# Patient Record
Sex: FEMALE | Race: WHITE | NOT HISPANIC OR LATINO | Employment: FULL TIME | URBAN - METROPOLITAN AREA
[De-identification: names, ages, dates, MRNs, and addresses within clinical notes are randomized per-mention and may not be internally consistent; named-entity substitution may affect disease eponyms.]

---

## 2017-11-27 ENCOUNTER — CONVERSION ENCOUNTER (OUTPATIENT)
Dept: MAMMOGRAPHY | Facility: HOSPITAL | Age: 52
End: 2017-11-27

## 2018-04-23 ENCOUNTER — CONVERSION ENCOUNTER (OUTPATIENT)
Dept: ORTHOPEDIC SURGERY | Facility: CLINIC | Age: 53
End: 2018-04-23

## 2018-04-23 ENCOUNTER — OFFICE VISIT CONVERTED (OUTPATIENT)
Dept: ORTHOPEDIC SURGERY | Facility: CLINIC | Age: 53
End: 2018-04-23
Attending: ORTHOPAEDIC SURGERY

## 2018-06-21 ENCOUNTER — OFFICE VISIT CONVERTED (OUTPATIENT)
Dept: ORTHOPEDIC SURGERY | Facility: CLINIC | Age: 53
End: 2018-06-21
Attending: ORTHOPAEDIC SURGERY

## 2018-09-20 ENCOUNTER — OFFICE VISIT CONVERTED (OUTPATIENT)
Dept: ORTHOPEDIC SURGERY | Facility: CLINIC | Age: 53
End: 2018-09-20
Attending: ORTHOPAEDIC SURGERY

## 2018-12-29 ENCOUNTER — CONVERSION ENCOUNTER (OUTPATIENT)
Dept: MAMMOGRAPHY | Facility: HOSPITAL | Age: 53
End: 2018-12-29

## 2019-03-07 ENCOUNTER — OFFICE VISIT CONVERTED (OUTPATIENT)
Dept: ORTHOPEDIC SURGERY | Facility: CLINIC | Age: 54
End: 2019-03-07
Attending: ORTHOPAEDIC SURGERY

## 2019-09-10 ENCOUNTER — HOSPITAL ENCOUNTER (OUTPATIENT)
Dept: OTHER | Facility: HOSPITAL | Age: 54
Discharge: HOME | End: 2019-09-10

## 2019-09-10 LAB
ALBUMIN SERPL-MCNC: 4.1 G/DL (ref 3.5–5)
ALBUMIN/GLOB SERPL: 1.3 {RATIO} (ref 1.4–2.6)
ALP SERPL-CCNC: 99 U/L (ref 53–141)
ALT SERPL-CCNC: 20 U/L (ref 10–40)
ANION GAP SERPL CALC-SCNC: 14 MMOL/L (ref 8–19)
AST SERPL-CCNC: 26 U/L (ref 15–50)
BASOPHILS # BLD AUTO: 0.11 10*3/UL (ref 0–0.2)
BASOPHILS NFR BLD AUTO: 2.6 % (ref 0–3)
BILIRUB SERPL-MCNC: 0.4 MG/DL (ref 0.2–1.3)
BUN SERPL-MCNC: 12 MG/DL (ref 5–25)
BUN/CREAT SERPL: 14 {RATIO} (ref 6–20)
CALCIUM SERPL-MCNC: 8.6 MG/DL (ref 8.7–10.4)
CHLORIDE SERPL-SCNC: 107 MMOL/L (ref 99–111)
CHOLEST SERPL-MCNC: 122 MG/DL (ref 107–200)
CHOLEST/HDLC SERPL: 5.3 {RATIO} (ref 3–6)
CONV ABS IMM GRAN: 0.01 10*3/UL (ref 0–0.2)
CONV CO2: 24 MMOL/L (ref 22–32)
CONV IMMATURE GRAN: 0.2 % (ref 0–1.8)
CONV TOTAL PROTEIN: 7.3 G/DL (ref 6.3–8.2)
CREAT UR-MCNC: 0.84 MG/DL (ref 0.5–0.9)
DEPRECATED RDW RBC AUTO: 51.4 FL (ref 36.4–46.3)
EOSINOPHIL # BLD AUTO: 0.7 10*3/UL (ref 0–0.7)
EOSINOPHIL # BLD AUTO: 16.3 % (ref 0–7)
ERYTHROCYTE [DISTWIDTH] IN BLOOD BY AUTOMATED COUNT: 14.6 % (ref 11.7–14.4)
GFR SERPLBLD BASED ON 1.73 SQ M-ARVRAT: >60 ML/MIN/{1.73_M2}
GLOBULIN UR ELPH-MCNC: 3.2 G/DL (ref 2–3.5)
GLUCOSE SERPL-MCNC: 89 MG/DL (ref 65–99)
HCT VFR BLD AUTO: 37.9 % (ref 37–47)
HDLC SERPL-MCNC: 23 MG/DL (ref 40–60)
HGB BLD-MCNC: 11.6 G/DL (ref 12–16)
LDLC SERPL CALC-MCNC: 83 MG/DL (ref 70–100)
LYMPHOCYTES # BLD AUTO: 1.44 10*3/UL (ref 1–5)
LYMPHOCYTES NFR BLD AUTO: 33.6 % (ref 20–45)
MCH RBC QN AUTO: 29.3 PG (ref 27–31)
MCHC RBC AUTO-ENTMCNC: 30.6 G/DL (ref 33–37)
MCV RBC AUTO: 95.7 FL (ref 81–99)
MONOCYTES # BLD AUTO: 0.55 10*3/UL (ref 0.2–1.2)
MONOCYTES NFR BLD AUTO: 12.8 % (ref 3–10)
NEUTROPHILS # BLD AUTO: 1.48 10*3/UL (ref 2–8)
NEUTROPHILS NFR BLD AUTO: 34.5 % (ref 30–85)
NRBC CBCN: 0 % (ref 0–0.7)
OSMOLALITY SERPL CALC.SUM OF ELEC: 291 MOSM/KG (ref 273–304)
PLATELET # BLD AUTO: 411 10*3/UL (ref 130–400)
PMV BLD AUTO: 10.6 FL (ref 9.4–12.3)
POTASSIUM SERPL-SCNC: 4 MMOL/L (ref 3.5–5.3)
RBC # BLD AUTO: 3.96 10*6/UL (ref 4.2–5.4)
SODIUM SERPL-SCNC: 141 MMOL/L (ref 135–147)
TRIGL SERPL-MCNC: 78 MG/DL (ref 40–150)
TSH SERPL-ACNC: 1.78 M[IU]/L (ref 0.27–4.2)
VLDLC SERPL-MCNC: 16 MG/DL (ref 5–37)
WBC # BLD AUTO: 4.29 10*3/UL (ref 4.8–10.8)

## 2019-09-16 ENCOUNTER — HOSPITAL ENCOUNTER (OUTPATIENT)
Dept: FAMILY MEDICINE CLINIC | Facility: CLINIC | Age: 54
Discharge: HOME | End: 2019-09-16
Attending: FAMILY MEDICINE

## 2019-09-19 LAB
CONV LAST MENSTURAL PERIOD: NORMAL
SPECIMEN SOURCE: NORMAL
SPECIMEN SOURCE: NORMAL
THIN PREP CVX: NORMAL

## 2019-10-03 ENCOUNTER — OFFICE VISIT CONVERTED (OUTPATIENT)
Dept: ORTHOPEDIC SURGERY | Facility: CLINIC | Age: 54
End: 2019-10-03
Attending: PHYSICIAN ASSISTANT

## 2019-12-23 ENCOUNTER — HOSPITAL ENCOUNTER (OUTPATIENT)
Dept: MRI IMAGING | Facility: HOSPITAL | Age: 54
Discharge: HOME | End: 2019-12-23
Attending: ORTHOPAEDIC SURGERY

## 2019-12-26 ENCOUNTER — OFFICE VISIT CONVERTED (OUTPATIENT)
Dept: ORTHOPEDIC SURGERY | Facility: CLINIC | Age: 54
End: 2019-12-26
Attending: ORTHOPAEDIC SURGERY

## 2019-12-31 ENCOUNTER — HOSPITAL ENCOUNTER (OUTPATIENT)
Dept: MAMMOGRAPHY | Facility: HOSPITAL | Age: 54
Discharge: HOME | End: 2019-12-31
Attending: FAMILY MEDICINE

## 2020-02-03 ENCOUNTER — HOSPITAL ENCOUNTER (OUTPATIENT)
Dept: PHYSICAL THERAPY | Facility: CLINIC | Age: 55
Setting detail: RECURRING SERIES
Discharge: HOME | End: 2020-03-09
Attending: ORTHOPAEDIC SURGERY

## 2020-09-15 ENCOUNTER — HOSPITAL ENCOUNTER (OUTPATIENT)
Dept: OTHER | Facility: HOSPITAL | Age: 55
Discharge: HOME | End: 2020-09-15

## 2020-09-15 LAB
ALBUMIN SERPL-MCNC: 4 G/DL (ref 3.5–5)
ALBUMIN/GLOB SERPL: 1.3 {RATIO} (ref 1.4–2.6)
ALP SERPL-CCNC: 99 U/L (ref 53–141)
ALT SERPL-CCNC: 22 U/L (ref 10–40)
ANION GAP SERPL CALC-SCNC: 12 MMOL/L (ref 8–19)
AST SERPL-CCNC: 24 U/L (ref 15–50)
BASOPHILS # BLD AUTO: 0.11 10*3/UL (ref 0–0.2)
BASOPHILS NFR BLD AUTO: 2.2 % (ref 0–3)
BILIRUB SERPL-MCNC: 0.32 MG/DL (ref 0.2–1.3)
BUN SERPL-MCNC: 13 MG/DL (ref 5–25)
BUN/CREAT SERPL: 14 {RATIO} (ref 6–20)
CALCIUM SERPL-MCNC: 8.9 MG/DL (ref 8.7–10.4)
CHLORIDE SERPL-SCNC: 107 MMOL/L (ref 99–111)
CHOLEST SERPL-MCNC: 148 MG/DL (ref 107–200)
CHOLEST/HDLC SERPL: 6.7 {RATIO} (ref 3–6)
CONV ABS IMM GRAN: 0.01 10*3/UL (ref 0–0.2)
CONV CO2: 26 MMOL/L (ref 22–32)
CONV IMMATURE GRAN: 0.2 % (ref 0–1.8)
CONV TOTAL PROTEIN: 7.1 G/DL (ref 6.3–8.2)
CREAT UR-MCNC: 0.9 MG/DL (ref 0.5–0.9)
DEPRECATED RDW RBC AUTO: 49.6 FL (ref 36.4–46.3)
EOSINOPHIL # BLD AUTO: 0.71 10*3/UL (ref 0–0.7)
EOSINOPHIL # BLD AUTO: 14.5 % (ref 0–7)
ERYTHROCYTE [DISTWIDTH] IN BLOOD BY AUTOMATED COUNT: 13.9 % (ref 11.7–14.4)
GFR SERPLBLD BASED ON 1.73 SQ M-ARVRAT: >60 ML/MIN/{1.73_M2}
GLOBULIN UR ELPH-MCNC: 3.1 G/DL (ref 2–3.5)
GLUCOSE SERPL-MCNC: 92 MG/DL (ref 65–99)
HCT VFR BLD AUTO: 38.9 % (ref 37–47)
HDLC SERPL-MCNC: 22 MG/DL (ref 40–60)
HGB BLD-MCNC: 12.3 G/DL (ref 12–16)
LDLC SERPL CALC-MCNC: 104 MG/DL (ref 70–100)
LYMPHOCYTES # BLD AUTO: 1.55 10*3/UL (ref 1–5)
LYMPHOCYTES NFR BLD AUTO: 31.7 % (ref 20–45)
MCH RBC QN AUTO: 30.4 PG (ref 27–31)
MCHC RBC AUTO-ENTMCNC: 31.6 G/DL (ref 33–37)
MCV RBC AUTO: 96.3 FL (ref 81–99)
MONOCYTES # BLD AUTO: 0.71 10*3/UL (ref 0.2–1.2)
MONOCYTES NFR BLD AUTO: 14.5 % (ref 3–10)
NEUTROPHILS # BLD AUTO: 1.8 10*3/UL (ref 2–8)
NEUTROPHILS NFR BLD AUTO: 36.9 % (ref 30–85)
NRBC CBCN: 0 % (ref 0–0.7)
OSMOLALITY SERPL CALC.SUM OF ELEC: 292 MOSM/KG (ref 273–304)
PLATELET # BLD AUTO: 416 10*3/UL (ref 130–400)
PMV BLD AUTO: 9.6 FL (ref 9.4–12.3)
POTASSIUM SERPL-SCNC: 4.2 MMOL/L (ref 3.5–5.3)
RBC # BLD AUTO: 4.04 10*6/UL (ref 4.2–5.4)
SODIUM SERPL-SCNC: 141 MMOL/L (ref 135–147)
TRIGL SERPL-MCNC: 111 MG/DL (ref 40–150)
TSH SERPL-ACNC: 2.03 M[IU]/L (ref 0.27–4.2)
VLDLC SERPL-MCNC: 22 MG/DL (ref 5–37)
WBC # BLD AUTO: 4.89 10*3/UL (ref 4.8–10.8)

## 2020-09-23 ENCOUNTER — HOSPITAL ENCOUNTER (OUTPATIENT)
Dept: FAMILY MEDICINE CLINIC | Facility: CLINIC | Age: 55
Discharge: HOME | End: 2020-09-23
Attending: FAMILY MEDICINE

## 2020-10-15 ENCOUNTER — CONVERSION ENCOUNTER (OUTPATIENT)
Dept: ORTHOPEDIC SURGERY | Facility: CLINIC | Age: 55
End: 2020-10-15

## 2020-10-15 ENCOUNTER — OFFICE VISIT CONVERTED (OUTPATIENT)
Dept: ORTHOPEDIC SURGERY | Facility: CLINIC | Age: 55
End: 2020-10-15
Attending: ORTHOPAEDIC SURGERY

## 2021-01-07 ENCOUNTER — HOSPITAL ENCOUNTER (OUTPATIENT)
Dept: MAMMOGRAPHY | Facility: HOSPITAL | Age: 56
Discharge: HOME | End: 2021-01-07
Attending: FAMILY MEDICINE

## 2021-01-22 ENCOUNTER — HOSPITAL ENCOUNTER (OUTPATIENT)
Dept: OTHER | Facility: HOSPITAL | Age: 56
Discharge: HOME | End: 2021-01-22
Attending: INTERNAL MEDICINE

## 2021-02-18 ENCOUNTER — HOSPITAL ENCOUNTER (OUTPATIENT)
Dept: VACCINE CLINIC | Facility: HOSPITAL | Age: 56
Discharge: HOME | End: 2021-02-18
Attending: INTERNAL MEDICINE

## 2021-05-13 NOTE — PROGRESS NOTES
Progress Note      Patient Name: Romelia Young   Patient ID: [id number]   Sex: Female   YOB: 1965    Primary Care Provider: Indra Dunlap MD   Referring Provider: Ephraim Lamb MD    Visit Date: October 15, 2020    Provider: Ephraim Lamb MD   Location: [facility]   Location Address: [address] [place] [address]  [id number]   Location Phone: [phone]          Chief Complaint  · Left hip pain   · Right shoulder naif n      History Of Present Illness  Romelia Young is a 55 year old /White female who presents today to [facility]. Patient presents today for follow-up. She has been getting injections in her right shoulder and left hip. She is requesting repeat injections today. She does have a known rotator cuff tear and is interested in surgery at some point, but is trying to put this off as long as possible.       Past Medical History  Allergic rhinitis, chronic; Anemia, Unspecified; Arthritis; Arthritis; Bursitis, right hip; Bursitis: Right Shoulder; GERD; Left ankle pain; Pain of right hip joint; Reflux; Right shoulder pain, unspecified chronicity; Seasonal allergies; Shoulder Impingement, right; Shoulder tendinitis; Sprain: Ankle, left; Tendonitis: Shoulder, Right; Trochanteric Bursitis, Right         Past Surgical History  *Metal Implant; C-sections; Cesarian Section; Clavicle Fracture Surgery - ORIF; Colonoscopy; endoscopy; Tuboplasty         Medication List  Celebrex 100 mg oral capsule; Celexa 20 mg oral tablet; Lipitor 10 mg oral tablet; Tylenol Extra Strength 500 mg oral tablet; Zantac 150 mg oral tablet; Zyrtec 10 mg oral tablet         Allergy List  NO KNOWN DRUG ALLERGIES         Family Medical History  Heart Disease; Hypertension; Diabetes, unspecified type; Renal Calculus; Family history of certain chronic disabling diseases; arthritis; Osteoporosis; Family history of Arthritis         Social History  Alcohol (Never); Alcohol Use  "(Never); Caffeine (Current some day); lives with children; lives with spouse; .; Recreational Drug Use (Never); Second hand smoke exposure (Never); Tobacco (Former); Working         Review of Systems  · Constitutional  o Denies  o : fever, chills, weight loss  · Cardiovascular  o Denies  o : chest pain, shortness of breath  · Gastrointestinal  o Denies  o : liver disease, heartburn, nausea, blood in stools  · Genitourinary  o Denies  o : painful urination, blood in urine  · Integument  o Denies  o : rash, itching  · Neurologic  o Denies  o : headache, weakness, loss of consciousness  · Musculoskeletal  o Admits  o : painful, swollen joints  · Psychiatric  o Denies  o : drug/alcohol addiction, anxiety, depression      Vitals  Date Time BP Position Site L\R Cuff Size HR RR TEMP (F) WT  HT  BMI kg/m2 BSA m2 O2 Sat FR L/min FiO2        10/15/2020 03:07 PM      77 - R   163lbs 4oz 5'  5\" 27.17 1.84 98 %            Physical Examination  · Constitutional  o Appearance  o : well developed, well-nourished, no obvious deformities present  · Head and Face  o Head  o :   § Inspection  § : normocephalic  o Face  o :   § Inspection  § : no facial lesions  · Eyes  o Conjunctivae  o : conjunctivae normal  o Sclerae  o : sclerae white  · Ears, Nose, Mouth and Throat  o Ears  o :   § External Ears  § : appearance within normal limits  § Hearing  § : intact  o Nose  o :   § External Nose  § : appearance normal  · Neck  o Inspection/Palpation  o : normal appearance  o Range of Motion  o : full range of motion  · Respiratory  o Respiratory Effort  o : breathing unlabored  o Inspection of Chest  o : normal appearance  o Auscultation of Lungs  o : no audible wheezing or rales  · Cardiovascular  o Heart  o : regular rate  · Gastrointestinal  o Abdominal Examination  o : soft and non-tender  · Skin and Subcutaneous Tissue  o General Inspection  o : intact, no rashes  · Psychiatric  o General  o : Alert and oriented x3  o Judgement " and Insight  o : judgment and insight intact  o Mood and Affect  o : mood normal, affect appropriate  · Right Shoulder  o Inspection  o : Tender to palpation over the lateral right shoulder. Range of motion is mildly limited. Strength 4+/5 rotator cuff. Neurovascularly intact to extremity. Positive impingement sign.  · Left Hip  o Inspection  o : Tender to palpation greater trochanter. Range of motion intact. 5/5 strength. Neurovascularly intact.   · Injection Note/Aspiration Note  o Site  o : right shoulder, left hip  o Procedure  o : Procedure: After educating the patient, patient gave consent for procedure. After using Chloraprep, the joint space was injected. The patient tolerated the procedure well.  o Medication  o : 7ml's of 1% Lidocaine, 3cc's of 4 mg Dexamethasone          Assessment  · Left hip trochanteric bursitis     727.3/M71.9  · Left Pain: Hip     719.45/M25.559  · Right shoulder pain, unspecified chronicity     719.41/M25.511  · Left shoulder rotator cuff tear     840.4/M75.100      Plan  · Orders  o 2.00 - Dexamethasone Injection 8mg (-5) - 719.45/M25.559 - 10/15/2020   Lot [id number] Exp 02 2021 [facility] Administered by CHIDI ESPARZA MD   o Hip Intra-articular Injection without US Guidance [facility] (27086) - 719.45/M25.559 - 10/15/2020   Lot [id number] Exp 02 2021 [facility] Administered by CHIDI ESPARZA MD   o 2.00 - Dexamethasone Injection 8mg (-6) - - 10/15/2020   Lot [id number] Exp 02 2021 [facility] Administered by CHIDI ESPARZA MD   o Shoulder Intra-articular Injection without US Guidance [facility] (60711) - - 10/15/2020   Lot [id number] Exp 08 01 2021 [facility] Administered by CHIDI ESPARZA MD   · Medications  o Medications have been Reconciled  o Transition of Care or Provider Policy  · Instructions  o Reviewed the patient's Past Medical, Social, and Family history as well as the ROS at today's visit, no changes.  o Call or return if worsening symptoms.  o This note is transcribed  by Valeria aguilar The above service was scribed by Marcia Christensen on my behalf and I attest to the accuracy of the note. ruth aguilar Discussed treatment option with the patient. She wishes to proceed with right shoulder injection and left hip injection and she tolerated this well. Plan for follow-up as needed.             Electronically Signed by: Valeria Duron, -Author on October 28, 2020 02:24:18 PM  Electronically Co-signed by: Ephraim Lamb MD -Reviewer on October 28, 2020 10:07:43 PM

## 2021-05-14 VITALS — OXYGEN SATURATION: 98 % | BODY MASS INDEX: 27.2 KG/M2 | WEIGHT: 163.25 LBS | HEART RATE: 77 BPM | HEIGHT: 65 IN

## 2021-05-15 VITALS — HEART RATE: 66 BPM | WEIGHT: 153.25 LBS | HEIGHT: 65 IN | BODY MASS INDEX: 25.53 KG/M2 | OXYGEN SATURATION: 97 %

## 2021-05-15 VITALS — HEART RATE: 77 BPM | HEIGHT: 65 IN | OXYGEN SATURATION: 98 %

## 2021-05-16 VITALS — BODY MASS INDEX: 25.34 KG/M2 | WEIGHT: 152.12 LBS | HEIGHT: 65 IN | OXYGEN SATURATION: 98 % | HEART RATE: 86 BPM

## 2021-05-16 VITALS — BODY MASS INDEX: 25.24 KG/M2 | WEIGHT: 151.5 LBS | OXYGEN SATURATION: 98 % | HEIGHT: 65 IN | HEART RATE: 84 BPM

## 2021-05-16 VITALS — OXYGEN SATURATION: 98 % | HEART RATE: 67 BPM | WEIGHT: 152.12 LBS | BODY MASS INDEX: 25.34 KG/M2 | HEIGHT: 65 IN

## 2021-05-16 VITALS — WEIGHT: 149 LBS | OXYGEN SATURATION: 95 % | HEIGHT: 65 IN | BODY MASS INDEX: 24.83 KG/M2

## 2021-07-27 ENCOUNTER — OFFICE VISIT (OUTPATIENT)
Dept: ORTHOPEDIC SURGERY | Facility: CLINIC | Age: 56
End: 2021-07-27

## 2021-07-27 VITALS — BODY MASS INDEX: 26.99 KG/M2 | WEIGHT: 162 LBS | RESPIRATION RATE: 14 BRPM | HEIGHT: 65 IN

## 2021-07-27 DIAGNOSIS — M25.551 BILATERAL HIP PAIN: Primary | ICD-10-CM

## 2021-07-27 DIAGNOSIS — M25.552 BILATERAL HIP PAIN: Primary | ICD-10-CM

## 2021-07-27 DIAGNOSIS — M70.60 TROCHANTERIC BURSITIS, UNSPECIFIED LATERALITY: ICD-10-CM

## 2021-07-27 PROCEDURE — 20610 DRAIN/INJ JOINT/BURSA W/O US: CPT | Performed by: ORTHOPAEDIC SURGERY

## 2021-07-27 PROCEDURE — 99214 OFFICE O/P EST MOD 30 MIN: CPT | Performed by: ORTHOPAEDIC SURGERY

## 2021-07-27 RX ORDER — ATORVASTATIN CALCIUM 10 MG/1
TABLET, FILM COATED ORAL
COMMUNITY
Start: 2021-05-17 | End: 2021-09-13 | Stop reason: ALTCHOICE

## 2021-07-27 RX ORDER — CETIRIZINE HYDROCHLORIDE 10 MG/1
TABLET ORAL
COMMUNITY

## 2021-07-27 RX ORDER — ACETAMINOPHEN 500 MG
TABLET ORAL
COMMUNITY
End: 2023-04-04

## 2021-07-27 RX ORDER — CELECOXIB 100 MG/1
CAPSULE ORAL
COMMUNITY
End: 2021-09-13 | Stop reason: SDUPTHER

## 2021-07-27 RX ORDER — CITALOPRAM 20 MG/1
TABLET ORAL
COMMUNITY
End: 2021-09-13 | Stop reason: ALTCHOICE

## 2021-07-27 RX ADMIN — METHYLPREDNISOLONE ACETATE 80 MG: 80 INJECTION, SUSPENSION INTRA-ARTICULAR; INTRALESIONAL; INTRAMUSCULAR; SOFT TISSUE at 13:43

## 2021-07-27 RX ADMIN — LIDOCAINE HYDROCHLORIDE 9 ML: 10 INJECTION, SOLUTION INFILTRATION; PERINEURAL at 13:43

## 2021-07-27 RX ADMIN — METHYLPREDNISOLONE ACETATE 80 MG: 80 INJECTION, SUSPENSION INTRA-ARTICULAR; INTRALESIONAL; INTRAMUSCULAR; SOFT TISSUE at 13:41

## 2021-07-27 RX ADMIN — LIDOCAINE HYDROCHLORIDE 9 ML: 10 INJECTION, SOLUTION INFILTRATION; PERINEURAL at 13:41

## 2021-08-01 RX ORDER — LIDOCAINE HYDROCHLORIDE 10 MG/ML
9 INJECTION, SOLUTION INFILTRATION; PERINEURAL
Status: COMPLETED | OUTPATIENT
Start: 2021-07-27 | End: 2021-07-27

## 2021-08-01 RX ORDER — METHYLPREDNISOLONE ACETATE 80 MG/ML
80 INJECTION, SUSPENSION INTRA-ARTICULAR; INTRALESIONAL; INTRAMUSCULAR; SOFT TISSUE
Status: COMPLETED | OUTPATIENT
Start: 2021-07-27 | End: 2021-07-27

## 2021-09-08 ENCOUNTER — LAB (OUTPATIENT)
Dept: LAB | Facility: HOSPITAL | Age: 56
End: 2021-09-08

## 2021-09-08 ENCOUNTER — TRANSCRIBE ORDERS (OUTPATIENT)
Dept: LAB | Facility: HOSPITAL | Age: 56
End: 2021-09-08

## 2021-09-08 DIAGNOSIS — Z13.9 SCREENING DUE: ICD-10-CM

## 2021-09-08 DIAGNOSIS — Z13.9 SCREENING DUE: Primary | ICD-10-CM

## 2021-09-08 LAB
ALBUMIN SERPL-MCNC: 4.3 G/DL (ref 3.5–5.2)
ALBUMIN/GLOB SERPL: 1.4 G/DL
ALP SERPL-CCNC: 105 U/L (ref 39–117)
ALT SERPL W P-5'-P-CCNC: 31 U/L (ref 1–33)
ANION GAP SERPL CALCULATED.3IONS-SCNC: 7.7 MMOL/L (ref 5–15)
AST SERPL-CCNC: 26 U/L (ref 1–32)
BASOPHILS # BLD AUTO: 0.12 10*3/MM3 (ref 0–0.2)
BASOPHILS NFR BLD AUTO: 2.3 % (ref 0–1.5)
BILIRUB SERPL-MCNC: 0.4 MG/DL (ref 0–1.2)
BUN SERPL-MCNC: 15 MG/DL (ref 6–20)
BUN/CREAT SERPL: 17.2 (ref 7–25)
CALCIUM SPEC-SCNC: 9.4 MG/DL (ref 8.6–10.5)
CHLORIDE SERPL-SCNC: 107 MMOL/L (ref 98–107)
CHOLEST SERPL-MCNC: 142 MG/DL (ref 0–200)
CO2 SERPL-SCNC: 25.3 MMOL/L (ref 22–29)
CREAT SERPL-MCNC: 0.87 MG/DL (ref 0.57–1)
DEPRECATED RDW RBC AUTO: 46.7 FL (ref 37–54)
EOSINOPHIL # BLD AUTO: 0.48 10*3/MM3 (ref 0–0.4)
EOSINOPHIL NFR BLD AUTO: 9.1 % (ref 0.3–6.2)
ERYTHROCYTE [DISTWIDTH] IN BLOOD BY AUTOMATED COUNT: 13.4 % (ref 12.3–15.4)
GFR SERPL CREATININE-BSD FRML MDRD: 68 ML/MIN/1.73
GLOBULIN UR ELPH-MCNC: 3.1 GM/DL
GLUCOSE SERPL-MCNC: 86 MG/DL (ref 65–99)
HCT VFR BLD AUTO: 39.2 % (ref 34–46.6)
HDLC SERPL-MCNC: 22 MG/DL (ref 40–60)
HGB BLD-MCNC: 12.6 G/DL (ref 12–15.9)
IMM GRANULOCYTES # BLD AUTO: 0.02 10*3/MM3 (ref 0–0.05)
IMM GRANULOCYTES NFR BLD AUTO: 0.4 % (ref 0–0.5)
LDLC SERPL CALC-MCNC: 102 MG/DL (ref 0–100)
LDLC/HDLC SERPL: 4.61 {RATIO}
LYMPHOCYTES # BLD AUTO: 1.47 10*3/MM3 (ref 0.7–3.1)
LYMPHOCYTES NFR BLD AUTO: 28 % (ref 19.6–45.3)
MCH RBC QN AUTO: 30.4 PG (ref 26.6–33)
MCHC RBC AUTO-ENTMCNC: 32.1 G/DL (ref 31.5–35.7)
MCV RBC AUTO: 94.5 FL (ref 79–97)
MONOCYTES # BLD AUTO: 0.75 10*3/MM3 (ref 0.1–0.9)
MONOCYTES NFR BLD AUTO: 14.3 % (ref 5–12)
NEUTROPHILS NFR BLD AUTO: 2.41 10*3/MM3 (ref 1.7–7)
NEUTROPHILS NFR BLD AUTO: 45.9 % (ref 42.7–76)
NRBC BLD AUTO-RTO: 0 /100 WBC (ref 0–0.2)
PLATELET # BLD AUTO: 474 10*3/MM3 (ref 140–450)
PMV BLD AUTO: 10.4 FL (ref 6–12)
POTASSIUM SERPL-SCNC: 3.9 MMOL/L (ref 3.5–5.2)
PROT SERPL-MCNC: 7.4 G/DL (ref 6–8.5)
RBC # BLD AUTO: 4.15 10*6/MM3 (ref 3.77–5.28)
SODIUM SERPL-SCNC: 140 MMOL/L (ref 136–145)
TRIGL SERPL-MCNC: 93 MG/DL (ref 0–150)
TSH SERPL DL<=0.05 MIU/L-ACNC: 1.18 UIU/ML (ref 0.27–4.2)
VLDLC SERPL-MCNC: 18 MG/DL (ref 5–40)
WBC # BLD AUTO: 5.25 10*3/MM3 (ref 3.4–10.8)

## 2021-09-08 PROCEDURE — 80061 LIPID PANEL: CPT

## 2021-09-08 PROCEDURE — 84443 ASSAY THYROID STIM HORMONE: CPT

## 2021-09-08 PROCEDURE — 36415 COLL VENOUS BLD VENIPUNCTURE: CPT

## 2021-09-08 PROCEDURE — 85025 COMPLETE CBC W/AUTO DIFF WBC: CPT

## 2021-09-08 PROCEDURE — 80053 COMPREHEN METABOLIC PANEL: CPT

## 2021-09-13 ENCOUNTER — TELEPHONE (OUTPATIENT)
Dept: FAMILY MEDICINE CLINIC | Facility: CLINIC | Age: 56
End: 2021-09-13

## 2021-09-13 ENCOUNTER — OFFICE VISIT (OUTPATIENT)
Dept: FAMILY MEDICINE CLINIC | Facility: CLINIC | Age: 56
End: 2021-09-13

## 2021-09-13 VITALS
HEART RATE: 69 BPM | DIASTOLIC BLOOD PRESSURE: 84 MMHG | HEIGHT: 65 IN | BODY MASS INDEX: 27.63 KG/M2 | RESPIRATION RATE: 18 BRPM | WEIGHT: 165.8 LBS | OXYGEN SATURATION: 99 % | TEMPERATURE: 98.8 F | SYSTOLIC BLOOD PRESSURE: 138 MMHG

## 2021-09-13 DIAGNOSIS — F41.9 ANXIETY: ICD-10-CM

## 2021-09-13 DIAGNOSIS — M25.50 ARTHRALGIA, UNSPECIFIED JOINT: ICD-10-CM

## 2021-09-13 DIAGNOSIS — K21.9 GASTROESOPHAGEAL REFLUX DISEASE WITHOUT ESOPHAGITIS: ICD-10-CM

## 2021-09-13 DIAGNOSIS — E78.6 LOW HDL (UNDER 40): ICD-10-CM

## 2021-09-13 DIAGNOSIS — J30.9 CHRONIC ALLERGIC RHINITIS: ICD-10-CM

## 2021-09-13 DIAGNOSIS — Z12.31 ENCOUNTER FOR SCREENING MAMMOGRAM FOR MALIGNANT NEOPLASM OF BREAST: Primary | ICD-10-CM

## 2021-09-13 PROBLEM — D64.9 ANEMIA: Status: ACTIVE | Noted: 2021-09-13

## 2021-09-13 PROBLEM — M25.551 PAIN OF RIGHT HIP JOINT: Status: ACTIVE | Noted: 2018-04-25

## 2021-09-13 PROBLEM — M70.61 TROCHANTERIC BURSITIS OF RIGHT HIP: Status: ACTIVE | Noted: 2018-06-26

## 2021-09-13 PROBLEM — M25.572 LEFT ANKLE PAIN: Status: ACTIVE | Noted: 2018-06-26

## 2021-09-13 PROBLEM — M25.811 SHOULDER IMPINGEMENT, RIGHT: Status: ACTIVE | Noted: 2018-09-25

## 2021-09-13 PROBLEM — S93.409A SPRAIN OF ANKLE: Status: ACTIVE | Noted: 2018-06-21

## 2021-09-13 PROBLEM — M25.511 RIGHT SHOULDER PAIN: Status: ACTIVE | Noted: 2018-04-25

## 2021-09-13 PROBLEM — M72.0 DUPUYTREN CONTRACTURE: Status: ACTIVE | Noted: 2020-01-16

## 2021-09-13 PROBLEM — M71.9 BURSITIS: Status: ACTIVE | Noted: 2018-04-25

## 2021-09-13 PROBLEM — J30.2 SEASONAL ALLERGIC RHINITIS: Status: ACTIVE | Noted: 2021-09-13

## 2021-09-13 PROBLEM — M75.41 SHOULDER IMPINGEMENT, RIGHT: Status: ACTIVE | Noted: 2018-09-25

## 2021-09-13 PROBLEM — M75.50 BURSITIS OF SHOULDER: Status: ACTIVE | Noted: 2018-09-25

## 2021-09-13 PROCEDURE — 99203 OFFICE O/P NEW LOW 30 MIN: CPT | Performed by: NURSE PRACTITIONER

## 2021-09-13 RX ORDER — CITALOPRAM 40 MG/1
40 TABLET ORAL DAILY
Qty: 90 TABLET | Refills: 3 | Status: SHIPPED | OUTPATIENT
Start: 2021-09-13 | End: 2022-07-27 | Stop reason: SDUPTHER

## 2021-09-13 RX ORDER — FAMOTIDINE 20 MG/1
20 TABLET, FILM COATED ORAL
COMMUNITY

## 2021-09-13 RX ORDER — ATORVASTATIN CALCIUM 10 MG/1
TABLET, FILM COATED ORAL
COMMUNITY
End: 2021-09-13 | Stop reason: SDUPTHER

## 2021-09-13 RX ORDER — CELECOXIB 100 MG/1
100 CAPSULE ORAL DAILY
Qty: 90 CAPSULE | Refills: 3 | Status: SHIPPED | OUTPATIENT
Start: 2021-09-13 | End: 2022-08-18 | Stop reason: SDUPTHER

## 2021-09-13 RX ORDER — ATORVASTATIN CALCIUM 10 MG/1
10 TABLET, FILM COATED ORAL NIGHTLY
Qty: 90 TABLET | Refills: 3 | Status: SHIPPED | OUTPATIENT
Start: 2021-09-13 | End: 2022-08-18 | Stop reason: SDUPTHER

## 2021-09-13 NOTE — PROGRESS NOTES
Chief Complaint  Establish Care (New Patient. Previous PCP was Dr. Dunlap), Annual Exam (Needing Annual Physical form completed for insurance.  Employee lab work done), and Anxiety (Taking Celexa. Pt reported her BP has been elevated more lately)    Subjective          Romelia Young is a 55 y.o. female who presents to [facility]    History of Present Illness     Chronic elevated platelets.   Eosinophils elevated chronically. Pt reports she was stung by yellow jackets recently prior to labs.     Allergies controlled with multiple otc allergy meds.     Lipids normal, HDL chronically low. Pt takes Lipitor to increase HDL.     Arthralgia - pt see ortho for steroid injections.    B/p wnl.    Anxiety - well controlled with meds.     GERD - improved with pepcid and diet modifications.     PHQ-2 Total Score: 0   PHQ-9 Total Score: 0       Review of Systems   Constitutional: Negative for chills, fatigue and fever.   Respiratory: Negative for cough and shortness of breath.    Cardiovascular: Negative for chest pain and palpitations.   Gastrointestinal: Negative for constipation, diarrhea, nausea and vomiting.   Musculoskeletal: Positive for arthralgias (multiple joints). Negative for back pain and neck pain.   Skin: Negative for rash.   Allergic/Immunologic: Positive for environmental allergies.   Neurological: Negative for dizziness and headaches.          Medical History: has a past medical history of Allergic, Anemia, unspecified, Ankle sprain (06/21/2018), Anxiety, Arthritis, Bursitis of hip, right (04/15/2018), Bursitis of right shoulder (09/25/2018), Chronic allergic rhinitis, Concussion (08/2012), GERD (gastroesophageal reflux disease), Hemorrhoids, Hernia, hiatal, Left ankle pain (06/26/2018), Menopause, Pain of right hip joint (04/25/2018), Right shoulder pain (04/25/2018), Seasonal allergies, Sensitive skin, Shoulder impingement, right (09/25/2018), Shoulder tendinitis  (2015), Shoulder tendinitis, right (2018), and Trochanteric bursitis, right hip (2018).     Surgical History: has a past surgical history that includes Other surgical history; Clavicle surgery (); Esophagogastroduodenoscopy; Other surgical history;  section (); and Colonoscopy (2017).     Family History: family history includes Arthritis in her father and mother; Diabetes in her father and sister; Heart disease in her father, mother, and sister; Hypertension in her father and mother; Osteoporosis in her mother; Urolithiasis in her brother.     Social History: reports that she quit smoking about 26 years ago. She started smoking about 38 years ago. She has a 3.00 pack-year smoking history. She has never used smokeless tobacco. She reports that she does not drink alcohol and does not use drugs.    Allergies: Patient has no known allergies.      Health Maintenance Due   Topic Date Due   • ANNUAL PHYSICAL  Never done   • TDAP/TD VACCINES (1 - Tdap) Never done   • ZOSTER VACCINE (1 of 2) Never done   • HEPATITIS C SCREENING  Never done            Current Outpatient Medications:   •  acetaminophen (TYLENOL) 500 MG tablet, Tylenol Extra Strength 500 mg oral tablet take 1 tablet (500 mg) by oral route every 3 hours as needed   Active, Disp: , Rfl:   •  atorvastatin (Lipitor) 10 MG tablet, Take 1 tablet by mouth Every Night., Disp: 90 tablet, Rfl: 3  •  celecoxib (CeleBREX) 100 MG capsule, Take 1 capsule by mouth Daily., Disp: 90 capsule, Rfl: 3  •  cetirizine (ZyrTEC Allergy) 10 MG tablet, Zyrtec oral tablet 10 mg take 1 tablet (10 mg) by oral route once daily   Active, Disp: , Rfl:   •  citalopram (CeleXA) 40 MG tablet, Take 1 tablet by mouth Daily., Disp: 90 tablet, Rfl: 3  •  famotidine (PEPCID) 20 MG tablet, Take 20 mg by mouth., Disp: , Rfl:       Immunization History   Administered Date(s) Administered   • COVID-19 (MODERNA) 2021, 2021         Objective       Vitals:  "   09/13/21 0834   BP: 138/84   Pulse: 69   Resp: 18   Temp: 98.8 °F (37.1 °C)   TempSrc: Temporal   SpO2: 99%   Weight: 75.2 kg (165 lb 12.8 oz)   Height: 165.1 cm (65\")   PainSc: 0-No pain      Body mass index is 27.59 kg/m².   Wt Readings from Last 3 Encounters:   09/13/21 75.2 kg (165 lb 12.8 oz)   07/27/21 73.5 kg (162 lb)   10/15/20 74 kg (163 lb 4 oz)      BP Readings from Last 3 Encounters:   09/13/21 138/84        Physical Exam  Vitals reviewed.   Constitutional:       Appearance: Normal appearance. She is well-developed.   HENT:      Head: Normocephalic and atraumatic.   Eyes:      Conjunctiva/sclera: Conjunctivae normal.      Pupils: Pupils are equal, round, and reactive to light.   Cardiovascular:      Rate and Rhythm: Normal rate and regular rhythm.      Heart sounds: Normal heart sounds. No murmur heard.     Pulmonary:      Effort: Pulmonary effort is normal.      Breath sounds: Normal breath sounds. No wheezing or rhonchi.   Abdominal:      General: Bowel sounds are normal. There is no distension.      Palpations: Abdomen is soft.      Tenderness: There is no abdominal tenderness.   Skin:     General: Skin is warm and dry.   Neurological:      Mental Status: She is alert and oriented to person, place, and time.   Psychiatric:         Mood and Affect: Mood and affect normal.         Behavior: Behavior normal.         Thought Content: Thought content normal.         Judgment: Judgment normal.             Result Review :     CMP    CMP 9/8/21   Glucose 86   BUN 15   Creatinine 0.87   eGFR Non African Am 68   Sodium 140   Potassium 3.9   Chloride 107   Calcium 9.4   Albumin 4.30   Total Bilirubin 0.4   Alkaline Phosphatase 105   AST (SGOT) 26   ALT (SGPT) 31           Lipid Panel    Lipid Panel 9/8/21   Total Cholesterol 142   Triglycerides 93   HDL Cholesterol 22 (A)   VLDL Cholesterol 18   LDL Cholesterol  102 (A)   LDL/HDL Ratio 4.61   (A) Abnormal value                           Assessment and Plan  "       Diagnoses and all orders for this visit:    1. Encounter for screening mammogram for malignant neoplasm of breast (Primary)  -     Mammo Screening Bilateral With CAD; Future    2. Gastroesophageal reflux disease without esophagitis    3. Low HDL (under 40)  -     atorvastatin (Lipitor) 10 MG tablet; Take 1 tablet by mouth Every Night.  Dispense: 90 tablet; Refill: 3    4. Anxiety  -     citalopram (CeleXA) 40 MG tablet; Take 1 tablet by mouth Daily.  Dispense: 90 tablet; Refill: 3    5. Chronic allergic rhinitis  Assessment & Plan:  Continue otc allergy meds.       6. Arthralgia, unspecified joint  -     celecoxib (CeleBREX) 100 MG capsule; Take 1 capsule by mouth Daily.  Dispense: 90 capsule; Refill: 3        Follow Up     Return in about 1 year (around 9/13/2022) for Annual physical.     Pap due in 2 years.     Patient was given instructions and counseling regarding her condition or for health maintenance advice. Please see specific information pulled into the AVS if appropriate.     KAITLYN Hooks

## 2021-09-13 NOTE — PATIENT INSTRUCTIONS
"[email or web address]   Generalized Anxiety Disorder, Adult  Generalized anxiety disorder (BRYANT) is a mental health condition. Unlike normal worries, anxiety related to BRYANT is not triggered by a specific event. These worries do not fade or get better with time. BRYANT interferes with relationships, work, and school.  BRYANT symptoms can vary from mild to severe. People with severe BRYANT can have intense waves of anxiety with physical symptoms that are similar to panic attacks.  What are the causes?  The exact cause of BRYANT is not known, but the following are believed to have an impact:  · Differences in natural brain chemicals.  · Genes passed down from parents to children.  · Differences in the way threats are perceived.  · Development during childhood.  · Personality.  What increases the risk?  The following factors may make you more likely to develop this condition:  · Being female.  · Having a family history of anxiety disorders.  · Being very shy.  · Experiencing very stressful life events, such as the death of a loved one.  · Having a very stressful family environment.  What are the signs or symptoms?  People with BRYANT often worry excessively about many things in their lives, such as their health and family. Symptoms may also include:  · Mental and emotional symptoms:  ? Worrying excessively about natural disasters.  ? Fear of being late.  ? Difficulty concentrating.  ? Fears that others are judging your performance.  · Physical symptoms:  ? Fatigue.  ? Headaches, muscle tension, muscle twitches, trembling, or feeling shaky.  ? Feeling like your heart is pounding or beating very fast.  ? Feeling out of breath or like you cannot take a deep breath.  ? Having trouble falling asleep or staying asleep, or experiencing restlessness.  ? Sweating.  ? Nausea, diarrhea, or irritable bowel syndrome (IBS).  · Behavioral symptoms:  ? Experiencing erratic moods or irritability.  ? Avoidance of new situations.  ? Avoidance of " people.  ? Extreme difficulty making decisions.  How is this diagnosed?  This condition is diagnosed based on your symptoms and medical history. You will also have a physical exam. Your health care provider may perform tests to rule out other possible causes of your symptoms.  To be diagnosed with BRYANT, a person must have anxiety that:  · Is out of his or her control.  · Affects several different aspects of his or her life, such as work and relationships.  · Causes distress that makes him or her unable to take part in normal activities.  · Includes at least three symptoms of BRYANT, such as restlessness, fatigue, trouble concentrating, irritability, muscle tension, or sleep problems.  Before your health care provider can confirm a diagnosis of BRYANT, these symptoms must be present more days than they are not, and they must last for 6 months or longer.  How is this treated?  This condition may be treated with:  · Medicine. Antidepressant medicine is usually prescribed for long-term daily control. Anti-anxiety medicines may be added in severe cases, especially when panic attacks occur.  · Talk therapy (psychotherapy). Certain types of talk therapy can be helpful in treating BRYANT by providing support, education, and guidance. Options include:  ? Cognitive behavioral therapy (CBT). People learn coping skills and self-calming techniques to ease their physical symptoms. They learn to identify unrealistic thoughts and behaviors and to replace them with more appropriate thoughts and behaviors.  ? Acceptance and commitment therapy (ACT). This treatment teaches people how to be mindful as a way to cope with unwanted thoughts and feelings.  ? Biofeedback. This process trains you to manage your body's response (physiological response) through breathing techniques and relaxation methods. You will work with a therapist while machines are used to monitor your physical symptoms.  · Stress management techniques. These include yoga,  meditation, and exercise.  A mental health specialist can help determine which treatment is best for you. Some people see improvement with one type of therapy. However, other people require a combination of therapies.  Follow these instructions at home:  Lifestyle  · Maintain a consistent routine and schedule.  · Anticipate stressful situations. Create a plan, and allow extra time to work with your plan.  · Practice stress management or self-calming techniques that you have learned from your therapist or your health care provider.  General instructions  · Take over-the-counter and prescription medicines only as told by your health care provider.  · Understand that you are likely to have setbacks. Accept this and be kind to yourself as you persist to take better care of yourself.  · Recognize and accept your accomplishments, even if you  them as small.  · Keep all follow-up visits as told by your health care provider. This is important.  Contact a health care provider if:  · Your symptoms do not get better.  · Your symptoms get worse.  · You have signs of depression, such as:  ? A persistently sad or irritable mood.  ? Loss of enjoyment in activities that used to bring you norma.  ? Change in weight or eating.  ? Changes in sleeping habits.  ? Avoiding friends or family members.  ? Loss of energy for normal tasks.  ? Feelings of guilt or worthlessness.  Get help right away if:  · You have serious thoughts about hurting yourself or others.  If you ever feel like you may hurt yourself or others, or have thoughts about taking your own life, get help right away. Go to your nearest emergency department or:  · Call your local emergency services (241 in the [place]  · Call a suicide crisis helpline, such as the National Suicide Prevention Lifeline at 1-274.767.2662. This is open 24 hours a day in the [place]  · Text the Crisis Text Line at 281692 (in the [place]  Summary  · Generalized anxiety disorder (BRYANT) is a mental  health condition that involves worry that is not triggered by a specific event.  · People with BRYANT often worry excessively about many things in their lives, such as their health and family.  · BRYANT may cause symptoms such as restlessness, trouble concentrating, sleep problems, frequent sweating, nausea, diarrhea, headaches, and trembling or muscle twitching.  · A mental health specialist can help determine which treatment is best for you. Some people see improvement with one type of therapy. However, other people require a combination of therapies.  This information is not intended to replace advice given to you by your health care provider. Make sure you discuss any questions you have with your health care provider.  Document Revised: 10/07/2020 Document Reviewed: 10/07/2020  [employer] Patient Education © 2021 [employer]      Preventive Care 40-64 Years Old, Female  Preventive care refers to visits with your health care provider and lifestyle choices that can promote health and wellness. This includes:  · A yearly physical exam. This may also be called an annual well check.  · Regular dental visits and eye exams.  · Immunizations.  · Screening for certain conditions.  · Healthy lifestyle choices, such as eating a healthy diet, getting regular exercise, not using drugs or products that contain nicotine and tobacco, and limiting alcohol use.  What can I expect for my preventive care visit?  Physical exam  Your health care provider will check your:  · Height and weight. This may be used to calculate body mass index (BMI), which tells if you are at a healthy weight.  · Heart rate and blood pressure.  · Skin for abnormal spots.  Counseling  Your health care provider may ask you questions about your:  · Alcohol, tobacco, and drug use.  · Emotional well-being.  · Home and relationship well-being.  · Sexual activity.  · Eating habits.  · Work and work environment.  · Method of birth control.  · Menstrual  cycle.  · Pregnancy history.  What immunizations do I need?    Influenza (flu) vaccine  · This is recommended every year.  Tetanus, diphtheria, and pertussis (Tdap) vaccine  · You may need a Td booster every 10 years.  Varicella (chickenpox) vaccine  · You may need this if you have not been vaccinated.  Zoster (shingles) vaccine  · You may need this after age 60.  Measles, mumps, and rubella (MMR) vaccine  · You may need at least one dose of MMR if you were born in 1957 or later. You may also need a second dose.  Pneumococcal conjugate (PCV13) vaccine  · You may need this if you have certain conditions and were not previously vaccinated.  Pneumococcal polysaccharide (PPSV23) vaccine  · You may need one or two doses if you smoke cigarettes or if you have certain conditions.  Meningococcal conjugate (MenACWY) vaccine  · You may need this if you have certain conditions.  Hepatitis A vaccine  · You may need this if you have certain conditions or if you travel or work in places where you may be exposed to hepatitis A.  Hepatitis B vaccine  · You may need this if you have certain conditions or if you travel or work in places where you may be exposed to hepatitis B.  Haemophilus influenzae type b (Hib) vaccine  · You may need this if you have certain conditions.  Human papillomavirus (HPV) vaccine  · If recommended by your health care provider, you may need three doses over 6 months.  You may receive vaccines as individual doses or as more than one vaccine together in one shot (combination vaccines). Talk with your health care provider about the risks and benefits of combination vaccines.  What tests do I need?  Blood tests  · Lipid and cholesterol levels. These may be checked every 5 years, or more frequently if you are over 50 years old.  · Hepatitis C test.  · Hepatitis B test.  Screening  · Lung cancer screening. You may have this screening every year starting at age 55 if you have a 30-pack-year history of smoking and  currently smoke or have quit within the past 15 years.  · Colorectal cancer screening. All adults should have this screening starting at age 50 and continuing until age 75. Your health care provider may recommend screening at age 45 if you are at increased risk. You will have tests every 1-10 years, depending on your results and the type of screening test.  · Diabetes screening. This is done by checking your blood sugar (glucose) after you have not eaten for a while (fasting). You may have this done every 1-3 years.  · Mammogram. This may be done every 1-2 years. Talk with your health care provider about when you should start having regular mammograms. This may depend on whether you have a family history of breast cancer.  · BRCA-related cancer screening. This may be done if you have a family history of breast, ovarian, tubal, or peritoneal cancers.  · Pelvic exam and Pap test. This may be done every 3 years starting at age 21. Starting at age 30, this may be done every 5 years if you have a Pap test in combination with an HPV test.  Other tests  · Sexually transmitted disease (STD) testing.  · Bone density scan. This is done to screen for osteoporosis. You may have this scan if you are at high risk for osteoporosis.  Follow these instructions at home:  Eating and drinking  · Eat a diet that includes fresh fruits and vegetables, whole grains, lean protein, and low-fat dairy.  · Take vitamin and mineral supplements as recommended by your health care provider.  · Do not drink alcohol if:  ? Your health care provider tells you not to drink.  ? You are pregnant, may be pregnant, or are planning to become pregnant.  · If you drink alcohol:  ? Limit how much you have to 0-1 drink a day.  ? Be aware of how much alcohol is in your drink. In the [place] one drink equals one 12 oz bottle of beer (355 mL), one 5 oz glass of wine (148 mL), or one 1½ oz glass of hard liquor (44 mL).  Lifestyle  · Take daily care of your teeth and  gums.  · Stay active. Exercise for at least 30 minutes on 5 or more days each week.  · Do not use any products that contain nicotine or tobacco, such as cigarettes, e-cigarettes, and chewing tobacco. If you need help quitting, ask your health care provider.  · If you are sexually active, practice safe sex. Use a condom or other form of birth control (contraception) in order to prevent pregnancy and STIs (sexually transmitted infections).  · If told by your health care provider, take low-dose aspirin daily starting at age 50.  What's next?  · Visit your health care provider once a year for a well check visit.  · Ask your health care provider how often you should have your eyes and teeth checked.  · Stay up to date on all vaccines.  This information is not intended to replace advice given to you by your health care provider. Make sure you discuss any questions you have with your health care provider.  Document Revised: 08/29/2019 Document Reviewed: 08/29/2019  [employer] Patient Education © 2020 [employer]

## 2021-12-17 ENCOUNTER — IMMUNIZATION (OUTPATIENT)
Dept: VACCINE CLINIC | Facility: HOSPITAL | Age: 56
End: 2021-12-17

## 2021-12-17 DIAGNOSIS — Z23 NEED FOR VACCINATION: Primary | ICD-10-CM

## 2021-12-17 PROCEDURE — 0064A HC ADM SARSCOV2 50MCG/0.25ML BOOSTER: CPT | Performed by: INTERNAL MEDICINE

## 2021-12-17 PROCEDURE — 91306 HC SARSCOV2 VAC 50MCG/0.25ML IM: CPT | Performed by: INTERNAL MEDICINE

## 2022-01-11 ENCOUNTER — HOSPITAL ENCOUNTER (OUTPATIENT)
Dept: MAMMOGRAPHY | Facility: HOSPITAL | Age: 57
Discharge: HOME | End: 2022-01-11
Admitting: NURSE PRACTITIONER

## 2022-01-11 DIAGNOSIS — Z12.31 ENCOUNTER FOR SCREENING MAMMOGRAM FOR MALIGNANT NEOPLASM OF BREAST: ICD-10-CM

## 2022-01-11 PROCEDURE — 77067 SCR MAMMO BI INCL CAD: CPT

## 2022-01-11 PROCEDURE — 77063 BREAST TOMOSYNTHESIS BI: CPT

## 2022-03-01 ENCOUNTER — OFFICE VISIT (OUTPATIENT)
Dept: PODIATRY | Facility: CLINIC | Age: 57
End: 2022-03-01

## 2022-03-01 VITALS
BODY MASS INDEX: 27.49 KG/M2 | OXYGEN SATURATION: 99 % | HEIGHT: 65 IN | TEMPERATURE: 97.3 F | HEART RATE: 75 BPM | WEIGHT: 165 LBS | DIASTOLIC BLOOD PRESSURE: 76 MMHG | SYSTOLIC BLOOD PRESSURE: 133 MMHG

## 2022-03-01 DIAGNOSIS — M79.671 FOOT PAIN, RIGHT: Primary | ICD-10-CM

## 2022-03-01 DIAGNOSIS — L03.031 PARONYCHIA OF TOE OF RIGHT FOOT: ICD-10-CM

## 2022-03-01 DIAGNOSIS — B35.1 ONYCHOMYCOSIS: ICD-10-CM

## 2022-03-01 DIAGNOSIS — L60.0 ONYCHOCRYPTOSIS: ICD-10-CM

## 2022-03-01 PROCEDURE — 11750 EXCISION NAIL&NAIL MATRIX: CPT | Performed by: PODIATRIST

## 2022-03-01 PROCEDURE — 99203 OFFICE O/P NEW LOW 30 MIN: CPT | Performed by: PODIATRIST

## 2022-03-01 NOTE — PROGRESS NOTES
[facility] - PODIATRY    Today's Date: 22    Patient Name: Romelia Young  MRN: [id number]  [facility]: [id number]  PCP: Haven Thomas APRN, Last PCP Visit: 2022  Referring Provider: No ref. provider found    SUBJECTIVE     Chief Complaint   Patient presents with   • Right Foot - Establish Care, Nail Problem     Patient wants 1st, 2nd, 3rd toenails removed      HPI: Romelia Young, a 56 y.o.female, comes to clinic.    New, Established, New Problem:  New  Location:  Dystrophic thickened painful toenails on the right first, second, and third toes  Duration:   Greater than 1 year  Onset:  Gradual  Nature:  sore with palpation.  Stable, worsening, improving:   Worsening  Aggravating factors:  Pain with shoe gear and ambulation.  Previous Treatment:  Self debridement    No other pedal complaints at this time.    Patient denies any fevers, chills, nausea, vomiting, shortness of breath, nor any other constitutional signs nor symptoms.       Past Medical History:   Diagnosis Date   • Allergic    • Anemia, unspecified    • Ankle sprain 2018    left   • Anxiety    • Arthritis    • Bursitis of hip, right 04/15/2018   • Bursitis of right shoulder 2018   • Callus    • Chronic allergic rhinitis    • Concussion 2012    Head injury   • GERD (gastroesophageal reflux disease)    • Hemorrhoids    • Hernia, hiatal    • History of transfusion    • Infectious viral hepatitis C antibodies   • Ingrown toenail Past   • Left ankle pain 2018   • Menopause     37 yrs old   • Pain of right hip joint 2018   • Plantar fasciitis Past   • Right shoulder pain 2018   • Seasonal allergies    • Sensitive skin    • Shoulder impingement, right 2018   • Shoulder tendinitis 2015   • Shoulder tendinitis, right 2018   • Trochanteric bursitis, right hip 2018     Past Surgical History:   Procedure Laterality Date   •  SECTION     • CLAVICLE SURGERY       clavicle fracture surgery - ORIF   • COLONOSCOPY  2017   • ENDOSCOPY     • OTHER SURGICAL HISTORY      Metal Implant   • OTHER SURGICAL HISTORY      Tuboplasty   • TOENAIL EXCISION       Family History   Problem Relation Age of Onset   • Heart disease Mother    • Hypertension Mother    • Arthritis Mother    • Osteoporosis Mother    • Heart disease Father    • Hypertension Father    • Diabetes Father    • Arthritis Father    • Heart disease Sister    • Diabetes Sister    • Urolithiasis Brother      Social History     Socioeconomic History   • Marital status:    Tobacco Use   • Smoking status: Former Smoker     Packs/day: 0.25     Years: 12.00     Pack years: 3.00     Start date:      Quit date:      Years since quittin.1   • Smokeless tobacco: Never Used   Vaping Use   • Vaping Use: Never used   Substance and Sexual Activity   • Alcohol use: Never   • Drug use: Never   • Sexual activity: Defer     No Known Allergies  Current Outpatient Medications   Medication Sig Dispense Refill   • acetaminophen (TYLENOL) 500 MG tablet Tylenol Extra Strength 500 mg oral tablet take 1 tablet (500 mg) by oral route every 3 hours as needed   Active     • atorvastatin (Lipitor) 10 MG tablet Take 1 tablet by mouth Every Night. 90 tablet 3   • celecoxib (CeleBREX) 100 MG capsule Take 1 capsule by mouth Daily. 90 capsule 3   • cetirizine (ZyrTEC Allergy) 10 MG tablet Zyrtec oral tablet 10 mg take 1 tablet (10 mg) by oral route once daily   Active     • citalopram (CeleXA) 40 MG tablet Take 1 tablet by mouth Daily. 90 tablet 3   • famotidine (PEPCID) 20 MG tablet Take 20 mg by mouth.       No current facility-administered medications for this visit.     Review of Systems   Skin:        Painful right first, second, third toenails   All other systems reviewed and are negative.      OBJECTIVE     Vitals:    22 1312   BP: 133/76   Pulse: 75   Temp: 97.3 °F (36.3 °C)   SpO2: 99%       PHYSICAL EXAM  GEN:       Foot/Ankle Exam:       General:   Appearance: appears stated age and healthy    Orientation: AAOx3    Affect: appropriate    Gait: unimpaired      VASCULAR      Right Foot Vascularity   Normal vascular exam    Dorsalis pedis:  2+  Posterior tibial:  2+  Skin Temperature: warm    Edema Grading:  None  CFT:  < 3 seconds  Pedal Hair Growth:  Present  Varicosities: none       Left Foot Vascularity   Normal vascular exam    Dorsalis pedis:  2+  Posterior tibial:  2+  Skin Temperature: warm    Edema Grading:  None  CFT:  < 3 seconds  Pedal Hair Growth:  Present  Varicosities: none        NEUROLOGIC     Right Foot Neurologic   Normal sensation    Light touch sensation:  Normal  Vibratory sensation:  Normal  Hot/Cold sensation: normal       Left Foot Neurologic   Normal sensation    Light touch sensation:  Normal  Vibratory sensation:  Normal  Hot/cold sensation: normal       MUSCLE STRENGTH     Right Foot Muscle Strength   Normal strength    Foot dorsiflexion:  5  Foot plantar flexion:  5  Foot inversion:  5  Foot eversion:  5     Left Foot Muscle Strength   Normal strength    Foot dorsiflexion:  5  Foot plantar flexion:  5  Foot inversion:  5  Foot eversion:  5     RANGE OF MOTION      Right Foot Range of Motion   Foot and ankle ROM within normal limits       Left Foot Range of Motion   Foot and ankle ROM within normal limits       DERMATOLOGIC     Right Foot Dermatologic   Skin: skin intact    Nails: onychomycosis, ingrown toenail and paronychia    Nails comment:  Right 1st, second, third     Left Foot Dermatologic   Skin: skin intact    Nails: normal        ASSESSMENT/PLAN     Diagnoses and all orders for this visit:    1. Foot pain, right (Primary)    2. Onychomycosis    3. Onychocryptosis    4. Paronychia of toe of right foot      Comprehensive lower extremity examination and evaluation was performed.    Discussed findings and treatment plan including risks, benefits, and treatment options with patient in detail.  Patient agreed with treatment plan.    Phenol and Alcohol Chemical Matrixectomy Procedure - This procedure is indicated for onychomycosis and onychocryptosis of the right first, second, third total toenails. Indications, risks and benefits and alternative treatments have been discussed with this patient who has agreed to this procedure. The area was sterilely prepped with a povidone-iodine solution. The affected area was locally anesthetized with 3 ml, of lidocaine 1%. The offending nail plate was completely excised.  Next 3 applications of 89% phenol were applied to the matrix area x 30 seconds followed by irrigation with copious amounts of isopropyl alcohol.  A sterile dressing was applied. The patient tolerated the procedure well.     Patient was given post procedure care instructions.  The patient states understanding and agreement with this plan.    An After Visit Summary was printed and given to the patient at discharge, including (if requested) any available informative/educational handouts regarding diagnosis, treatment, or medications. All questions were answered to patient/family satisfaction. Should symptoms fail to improve or worsen they agree to call or return to clinic or to go to the Emergency Department. Discussed the importance of following up with any needed screening tests/labs/specialist appointments and any requested follow-up recommended by me today. Importance of maintaining follow-up discussed and patient accepts that missed appointments can delay diagnosis and potentially lead to worsening of conditions.    Return in about 2 weeks (around 3/15/2022) for Post-Procedure., or sooner if acute issues arise.    This document has been electronically signed by Bienvenido Travis DPM on March 1, 2022 13:49 EST

## 2022-03-15 ENCOUNTER — OFFICE VISIT (OUTPATIENT)
Dept: PODIATRY | Facility: CLINIC | Age: 57
End: 2022-03-15

## 2022-03-15 VITALS
SYSTOLIC BLOOD PRESSURE: 138 MMHG | OXYGEN SATURATION: 100 % | BODY MASS INDEX: 26.33 KG/M2 | WEIGHT: 158 LBS | TEMPERATURE: 97.5 F | DIASTOLIC BLOOD PRESSURE: 79 MMHG | HEIGHT: 65 IN | HEART RATE: 86 BPM

## 2022-03-15 DIAGNOSIS — L03.031 PARONYCHIA OF TOE OF RIGHT FOOT: ICD-10-CM

## 2022-03-15 DIAGNOSIS — B35.1 ONYCHOMYCOSIS: ICD-10-CM

## 2022-03-15 DIAGNOSIS — L60.0 ONYCHOCRYPTOSIS: ICD-10-CM

## 2022-03-15 DIAGNOSIS — M79.671 FOOT PAIN, RIGHT: Primary | ICD-10-CM

## 2022-03-15 PROCEDURE — 99212 OFFICE O/P EST SF 10 MIN: CPT | Performed by: PODIATRIST

## 2022-03-15 NOTE — PROGRESS NOTES
[facility] - PODIATRY    Today's Date: 03/15/22    Patient Name: Romelia Young  MRN: [id number]  [facility]: [id number]  PCP: Haven Thomas APRN, Last PCP Visit: 2022  Referring Provider: No ref. provider found    SUBJECTIVE     Chief Complaint   Patient presents with   • Right Foot - Follow-up     P&A     HPI: Romelia Young, a 56 y.o.female, comes to clinic.    New, Established, New Problem:  Established  Location:  Dystrophic thickened painful toenails on the right first, second, and third toes  Duration:   Greater than 1 year  Onset:  Gradual  Nature:  sore with palpation.  Stable, worsening, improving:   Improving  Aggravating factors:  Pain with shoe gear and ambulation.  Previous Treatment:  chemical matrixectomies    No other pedal complaints at this time.    Patient denies any fevers, chills, nausea, vomiting, shortness of breath, nor any other constitutional signs nor symptoms.       Past Medical History:   Diagnosis Date   • Allergic    • Anemia, unspecified    • Ankle sprain 2018    left   • Anxiety    • Arthritis    • Bursitis of hip, right 04/15/2018   • Bursitis of right shoulder 2018   • Callus    • Chronic allergic rhinitis    • Concussion 2012    Head injury   • GERD (gastroesophageal reflux disease)    • Hemorrhoids    • Hernia, hiatal    • History of transfusion    • Infectious viral hepatitis C antibodies   • Ingrown toenail Past   • Left ankle pain 2018   • Menopause     37 yrs old   • Pain of right hip joint 2018   • Plantar fasciitis Past   • Right shoulder pain 2018   • Seasonal allergies    • Sensitive skin    • Shoulder impingement, right 2018   • Shoulder tendinitis 2015   • Shoulder tendinitis, right 2018   • Trochanteric bursitis, right hip 2018     Past Surgical History:   Procedure Laterality Date   •  SECTION     • CLAVICLE SURGERY      clavicle fracture surgery - ORIF   •  COLONOSCOPY  2017   • ENDOSCOPY     • OTHER SURGICAL HISTORY      Metal Implant   • OTHER SURGICAL HISTORY      Tuboplasty   • TOENAIL EXCISION       Family History   Problem Relation Age of Onset   • Heart disease Mother    • Hypertension Mother    • Arthritis Mother    • Osteoporosis Mother    • Heart disease Father    • Hypertension Father    • Diabetes Father    • Arthritis Father    • Heart disease Sister    • Diabetes Sister    • Urolithiasis Brother      Social History     Socioeconomic History   • Marital status:    Tobacco Use   • Smoking status: Former Smoker     Packs/day: 0.25     Years: 12.00     Pack years: 3.00     Start date:      Quit date:      Years since quittin.2   • Smokeless tobacco: Never Used   Vaping Use   • Vaping Use: Never used   Substance and Sexual Activity   • Alcohol use: Never   • Drug use: Never   • Sexual activity: Defer     No Known Allergies  Current Outpatient Medications   Medication Sig Dispense Refill   • acetaminophen (TYLENOL) 500 MG tablet Tylenol Extra Strength 500 mg oral tablet take 1 tablet (500 mg) by oral route every 3 hours as needed   Active     • atorvastatin (Lipitor) 10 MG tablet Take 1 tablet by mouth Every Night. 90 tablet 3   • celecoxib (CeleBREX) 100 MG capsule Take 1 capsule by mouth Daily. 90 capsule 3   • cetirizine (zyrTEC) 10 MG tablet Zyrtec oral tablet 10 mg take 1 tablet (10 mg) by oral route once daily   Active     • citalopram (CeleXA) 40 MG tablet Take 1 tablet by mouth Daily. 90 tablet 3   • famotidine (PEPCID) 20 MG tablet Take 20 mg by mouth.       No current facility-administered medications for this visit.     Review of Systems   Skin:        Follow-up for right first, second, third toenails   All other systems reviewed and are negative.      OBJECTIVE     Vitals:    03/15/22 1525   BP: 138/79   Pulse: 86   Temp: 97.5 °F (36.4 °C)   SpO2: 100%       PHYSICAL EXAM  GEN:      Foot/Ankle Exam:       General:    Appearance: appears stated age and healthy    Orientation: AAOx3    Affect: appropriate    Gait: unimpaired      VASCULAR      Right Foot Vascularity   Normal vascular exam    Dorsalis pedis:  2+  Posterior tibial:  2+  Skin Temperature: warm    Edema Grading:  None  CFT:  < 3 seconds  Pedal Hair Growth:  Present  Varicosities: none       Left Foot Vascularity   Normal vascular exam    Dorsalis pedis:  2+  Posterior tibial:  2+  Skin Temperature: warm    Edema Grading:  None  CFT:  < 3 seconds  Pedal Hair Growth:  Present  Varicosities: none        NEUROLOGIC     Right Foot Neurologic   Normal sensation    Light touch sensation:  Normal  Vibratory sensation:  Normal  Hot/Cold sensation: normal       Left Foot Neurologic   Normal sensation    Light touch sensation:  Normal  Vibratory sensation:  Normal  Hot/cold sensation: normal       MUSCLE STRENGTH     Right Foot Muscle Strength   Normal strength    Foot dorsiflexion:  5  Foot plantar flexion:  5  Foot inversion:  5  Foot eversion:  5     Left Foot Muscle Strength   Normal strength    Foot dorsiflexion:  5  Foot plantar flexion:  5  Foot inversion:  5  Foot eversion:  5     RANGE OF MOTION      Right Foot Range of Motion   Foot and ankle ROM within normal limits       Left Foot Range of Motion   Foot and ankle ROM within normal limits       DERMATOLOGIC     Right Foot Dermatologic   Skin: skin intact    Nails: no onychomycosis, no ingrown toenail and no paronychia    Nails comment:  Right 1st, second, third; healing without complications     Left Foot Dermatologic   Skin: skin intact    Nails: normal        ASSESSMENT/PLAN     Diagnoses and all orders for this visit:    1. Foot pain, right (Primary)    2. Paronychia of toe of right foot    3. Onychocryptosis    4. Onychomycosis      Comprehensive lower extremity examination and evaluation was performed.    Discussed findings and treatment plan including risks, benefits, and treatment options with patient in  detail. Patient agreed with treatment plan.    Patient is to monitor for recurrence and any new symptoms and to contact Dr. Travis's office for a follow-up appointment.      The patient states understanding and agreement with this plan.    An After Visit Summary was printed and given to the patient at discharge, including (if requested) any available informative/educational handouts regarding diagnosis, treatment, or medications. All questions were answered to patient/family satisfaction. Should symptoms fail to improve or worsen they agree to call or return to clinic or to go to the Emergency Department. Discussed the importance of following up with any needed screening tests/labs/specialist appointments and any requested follow-up recommended by me today. Importance of maintaining follow-up discussed and patient accepts that missed appointments can delay diagnosis and potentially lead to worsening of conditions.    Return if symptoms worsen or fail to improve., or sooner if acute issues arise.    This document has been electronically signed by Bienvenido Travis DPM on March 15, 2022 16:25 EDT

## 2022-07-25 ENCOUNTER — TELEPHONE (OUTPATIENT)
Dept: FAMILY MEDICINE CLINIC | Facility: CLINIC | Age: 57
End: 2022-07-25

## 2022-07-27 DIAGNOSIS — F41.9 ANXIETY: ICD-10-CM

## 2022-07-27 RX ORDER — CITALOPRAM 40 MG/1
40 TABLET ORAL DAILY
Qty: 90 TABLET | Refills: 3 | Status: SHIPPED | OUTPATIENT
Start: 2022-07-27 | End: 2022-08-18 | Stop reason: SDUPTHER

## 2022-08-18 ENCOUNTER — OFFICE VISIT (OUTPATIENT)
Dept: FAMILY MEDICINE CLINIC | Facility: CLINIC | Age: 57
End: 2022-08-18

## 2022-08-18 VITALS
DIASTOLIC BLOOD PRESSURE: 79 MMHG | HEIGHT: 65 IN | SYSTOLIC BLOOD PRESSURE: 144 MMHG | OXYGEN SATURATION: 98 % | TEMPERATURE: 97.7 F | HEART RATE: 65 BPM | WEIGHT: 160.2 LBS | BODY MASS INDEX: 26.69 KG/M2

## 2022-08-18 DIAGNOSIS — F41.9 ANXIETY: ICD-10-CM

## 2022-08-18 DIAGNOSIS — Z01.419 GYNECOLOGIC EXAM NORMAL: ICD-10-CM

## 2022-08-18 DIAGNOSIS — K21.9 GASTROESOPHAGEAL REFLUX DISEASE WITHOUT ESOPHAGITIS: ICD-10-CM

## 2022-08-18 DIAGNOSIS — M25.50 ARTHRALGIA, UNSPECIFIED JOINT: ICD-10-CM

## 2022-08-18 DIAGNOSIS — E78.6 LOW HDL (UNDER 40): Primary | ICD-10-CM

## 2022-08-18 DIAGNOSIS — D64.9 ANEMIA, UNSPECIFIED TYPE: ICD-10-CM

## 2022-08-18 LAB
BASOPHILS # BLD AUTO: 0.1 10*3/MM3 (ref 0–0.2)
BASOPHILS NFR BLD AUTO: 1.6 % (ref 0–1.5)
DEPRECATED RDW RBC AUTO: 41.8 FL (ref 37–54)
EOSINOPHIL # BLD AUTO: 0.9 10*3/MM3 (ref 0–0.4)
EOSINOPHIL NFR BLD AUTO: 14.7 % (ref 0.3–6.2)
ERYTHROCYTE [DISTWIDTH] IN BLOOD BY AUTOMATED COUNT: 12.9 % (ref 12.3–15.4)
HCT VFR BLD AUTO: 36.7 % (ref 34–46.6)
HGB BLD-MCNC: 12.2 G/DL (ref 12–15.9)
IMM GRANULOCYTES # BLD AUTO: 0.01 10*3/MM3 (ref 0–0.05)
IMM GRANULOCYTES NFR BLD AUTO: 0.2 % (ref 0–0.5)
LYMPHOCYTES # BLD AUTO: 2.06 10*3/MM3 (ref 0.7–3.1)
LYMPHOCYTES NFR BLD AUTO: 33.6 % (ref 19.6–45.3)
MCH RBC QN AUTO: 29.7 PG (ref 26.6–33)
MCHC RBC AUTO-ENTMCNC: 33.2 G/DL (ref 31.5–35.7)
MCV RBC AUTO: 89.3 FL (ref 79–97)
MONOCYTES # BLD AUTO: 0.79 10*3/MM3 (ref 0.1–0.9)
MONOCYTES NFR BLD AUTO: 12.9 % (ref 5–12)
NEUTROPHILS NFR BLD AUTO: 2.28 10*3/MM3 (ref 1.7–7)
NEUTROPHILS NFR BLD AUTO: 37 % (ref 42.7–76)
NRBC BLD AUTO-RTO: 0 /100 WBC (ref 0–0.2)
PLATELET # BLD AUTO: 436 10*3/MM3 (ref 140–450)
PMV BLD AUTO: 10.4 FL (ref 6–12)
RBC # BLD AUTO: 4.11 10*6/MM3 (ref 3.77–5.28)
TSH SERPL DL<=0.05 MIU/L-ACNC: 1.46 UIU/ML (ref 0.27–4.2)
WBC NRBC COR # BLD: 6.14 10*3/MM3 (ref 3.4–10.8)

## 2022-08-18 PROCEDURE — 80050 GENERAL HEALTH PANEL: CPT | Performed by: FAMILY MEDICINE

## 2022-08-18 PROCEDURE — 80061 LIPID PANEL: CPT | Performed by: FAMILY MEDICINE

## 2022-08-18 PROCEDURE — 99213 OFFICE O/P EST LOW 20 MIN: CPT | Performed by: FAMILY MEDICINE

## 2022-08-18 PROCEDURE — G0123 SCREEN CERV/VAG THIN LAYER: HCPCS | Performed by: FAMILY MEDICINE

## 2022-08-18 RX ORDER — CELECOXIB 100 MG/1
100 CAPSULE ORAL DAILY
Qty: 90 CAPSULE | Refills: 3 | Status: SHIPPED | OUTPATIENT
Start: 2022-08-18

## 2022-08-18 RX ORDER — ATORVASTATIN CALCIUM 10 MG/1
10 TABLET, FILM COATED ORAL NIGHTLY
Qty: 90 TABLET | Refills: 3 | Status: SHIPPED | OUTPATIENT
Start: 2022-08-18

## 2022-08-18 RX ORDER — CITALOPRAM 40 MG/1
40 TABLET ORAL DAILY
Qty: 90 TABLET | Refills: 3 | Status: SHIPPED | OUTPATIENT
Start: 2022-08-18

## 2022-08-18 RX ORDER — LIDOCAINE HYDROCHLORIDE 10 MG/ML
1 INJECTION, SOLUTION INFILTRATION; PERINEURAL
COMMUNITY
Start: 2022-05-22 | End: 2023-03-13

## 2022-08-18 NOTE — ASSESSMENT & PLAN NOTE
She presented today for routine gynecological exam.  Her exams have been normal.  She has no specific gynecological concerns or complaints today.  A ThinPrep Pap smear was obtained as well as a routine mammogram.  Her mammogram is not due till the first of this next year.

## 2022-08-18 NOTE — PROGRESS NOTES
Chief Complaint   Patient presents with   • Gynecologic Exam        Subjective     Romelia Young  has a past medical history of Allergic, Anemia, unspecified, Ankle sprain (2018), Anxiety, Arthritis, Bursitis of hip, right (04/15/2018), Bursitis of right shoulder (2018), Callus, Chronic allergic rhinitis, Concussion (2012), GERD (gastroesophageal reflux disease), Hemorrhoids, Hernia, hiatal, History of transfusion, Infectious viral hepatitis (C antibodies), Ingrown toenail (Past), Left ankle pain (2018), Menopause, Pain of right hip joint (2018), Plantar fasciitis (Past), Right shoulder pain (2018), Seasonal allergies, Sensitive skin, Shoulder impingement, right (2018), Shoulder tendinitis (2015), Shoulder tendinitis, right (2018), and Trochanteric bursitis, right hip (2018).    Gynecological exam- she is a  2 para two 56-year-old woman.  She has not had a menstrual period in 19 to 20 years.  Her only menopausal symptoms are some mood irritability some vaginal dryness and loss of libido.  Her last routine Pap smear was 2 years ago which was normal.  Her mammogram was in January of this year which was within normal limits.      PHQ-2 Depression Screening  Little interest or pleasure in doing things?     Feeling down, depressed, or hopeless?     PHQ-2 Total Score     PHQ-9 Depression Screening  Little interest or pleasure in doing things?     Feeling down, depressed, or hopeless?     Trouble falling or staying asleep, or sleeping too much?     Feeling tired or having little energy?     Poor appetite or overeating?     Feeling bad about yourself - or that you are a failure or have let yourself or your family down?     Trouble concentrating on things, such as reading the newspaper or watching television?     Moving or speaking so slowly that other people could have noticed? Or the opposite - being so fidgety or restless that you have been moving  around a lot more than usual?     Thoughts that you would be better off dead, or of hurting yourself in some way?     PHQ-9 Total Score     If you checked off any problems, how difficult have these problems made it for you to do your work, take care of things at home, or get along with other people?       No Known Allergies    Prior to Admission medications    Medication Sig Start Date End Date Taking? Authorizing Provider   acetaminophen (TYLENOL) 500 MG tablet Tylenol Extra Strength 500 mg oral tablet take 1 tablet (500 mg) by oral route every 3 hours as needed   Active   Yes Erwin Mason MD   atorvastatin (Lipitor) 10 MG tablet Take 1 tablet by mouth Every Night. 21  Yes Haven Thomas APRN   celecoxib (CeleBREX) 100 MG capsule Take 1 capsule by mouth Daily. 21  Yes Haven Thomas APRN   cetirizine (zyrTEC) 10 MG tablet Zyrtec oral tablet 10 mg take 1 tablet (10 mg) by oral route once daily   Active   Yes Erwin Mason MD   citalopram (CeleXA) 40 MG tablet Take 1 tablet by mouth Daily. 22  Yes Avery Espinosa,    famotidine (PEPCID) 20 MG tablet Take 20 mg by mouth.   Yes ProviderErwin MD   lidocaine (XYLOCAINE) 1 % injection 1 mL. 22  Yes Erwin Mason MD        Patient Active Problem List   Diagnosis   • Trochanteric bursitis   • Bilateral hip pain   • GERD (gastroesophageal reflux disease)   • Anemia   • Bursitis   • Disorder of bursae and tendons in shoulder region   • Dupuytren contracture   • Left ankle pain   • Right shoulder pain   • Chronic allergic rhinitis   • Shoulder impingement, right   • Sprain of ankle   • Trochanteric bursitis of right hip   • Pain of right hip joint   • Bursitis of shoulder   • Anxiety   • Low HDL (under 40)   • Anemia, unspecified   • Gynecologic exam normal        Past Surgical History:   Procedure Laterality Date   •  SECTION     • CLAVICLE SURGERY      clavicle fracture surgery - ORIF   •  "COLONOSCOPY  2017   • ENDOSCOPY     • OTHER SURGICAL HISTORY      Metal Implant   • OTHER SURGICAL HISTORY      Tuboplasty   • TOENAIL EXCISION         Social History     Socioeconomic History   • Marital status:    Tobacco Use   • Smoking status: Former Smoker     Packs/day: 0.25     Years: 12.00     Pack years: 3.00     Start date:      Quit date:      Years since quittin.6   • Smokeless tobacco: Never Used   Vaping Use   • Vaping Use: Never used   Substance and Sexual Activity   • Alcohol use: Never   • Drug use: Never   • Sexual activity: Defer       Family History   Problem Relation Age of Onset   • Heart disease Mother    • Hypertension Mother    • Arthritis Mother    • Osteoporosis Mother    • Heart disease Father    • Hypertension Father    • Diabetes Father    • Arthritis Father    • Heart disease Sister    • Diabetes Sister    • Urolithiasis Brother        Family history, surgical history, past medical history, Allergies and meds reviewed with patient today and updated in [employer] EMR.     ROS:  Review of Systems   Constitutional: Negative for fatigue.   Genitourinary: Negative for breast lump, breast pain, vaginal bleeding and vaginal discharge.       OBJECTIVE:  Vitals:    22 1444   BP: 144/79   BP Location: Right arm   Patient Position: Sitting   Pulse: 65   Temp: 97.7 °F (36.5 °C)   SpO2: 98%   Weight: 72.7 kg (160 lb 3.2 oz)   Height: 165.1 cm (65\")     No exam data present   Body mass index is 26.66 kg/m².  No LMP recorded (lmp unknown). Patient is postmenopausal.    Physical Exam  Vitals and nursing note reviewed. Exam conducted with a chaperone present.   Constitutional:       General: She is not in acute distress.     Appearance: Normal appearance. She is normal weight.   HENT:      Head: Normocephalic.   Cardiovascular:      Rate and Rhythm: Normal rate and regular rhythm.      Heart sounds: Normal heart sounds. No murmur heard.  Pulmonary:      Effort: Pulmonary " effort is normal.      Breath sounds: Normal breath sounds. No wheezing or rhonchi.   Chest:   Breasts:      Right: Normal. No mass or skin change.      Left: Normal. No mass or skin change.       Abdominal:      General: Abdomen is flat. Bowel sounds are normal.      Palpations: Abdomen is soft. There is no mass.      Tenderness: There is no abdominal tenderness.   Genitourinary:     General: Normal vulva.      Exam position: Lithotomy position.      Pubic Area: No rash.       Labia:         Right: No rash or tenderness.         Left: No rash or tenderness.       Urethra: No urethral pain.      Vagina: Normal. No vaginal discharge or tenderness.      Cervix: No discharge.      Uterus: Normal. Not tender.       Adnexa: Right adnexa normal and left adnexa normal.   Neurological:      Mental Status: She is alert.         Procedures    No visits with results within 30 Day(s) from this visit.   Latest known visit with results is:   Lab on 09/08/2021   Component Date Value Ref Range Status   • Glucose 09/08/2021 86  65 - 99 mg/dL Final   • BUN 09/08/2021 15  6 - 20 mg/dL Final   • Creatinine 09/08/2021 0.87  0.57 - 1.00 mg/dL Final   • Sodium 09/08/2021 140  136 - 145 mmol/L Final   • Potassium 09/08/2021 3.9  3.5 - 5.2 mmol/L Final   • Chloride 09/08/2021 107  98 - 107 mmol/L Final   • CO2 09/08/2021 25.3  22.0 - 29.0 mmol/L Final   • Calcium 09/08/2021 9.4  8.6 - 10.5 mg/dL Final   • Total Protein 09/08/2021 7.4  6.0 - 8.5 g/dL Final   • Albumin 09/08/2021 4.30  3.50 - 5.20 g/dL Final   • ALT (SGPT) 09/08/2021 31  1 - 33 U/L Final   • AST (SGOT) 09/08/2021 26  1 - 32 U/L Final   • Alkaline Phosphatase 09/08/2021 105  39 - 117 U/L Final   • Total Bilirubin 09/08/2021 0.4  0.0 - 1.2 mg/dL Final   • eGFR Non  Amer 09/08/2021 68  >60 mL/min/1.73 Final   • Globulin 09/08/2021 3.1  gm/dL Final   • A/G Ratio 09/08/2021 1.4  g/dL Final   • BUN/Creatinine Ratio 09/08/2021 17.2  7.0 - 25.0 Final   • Anion Gap 09/08/2021  7.7  5.0 - 15.0 mmol/L Final   • Total Cholesterol 09/08/2021 142  0 - 200 mg/dL Final   • Triglycerides 09/08/2021 93  0 - 150 mg/dL Final   • HDL Cholesterol 09/08/2021 22 (A) 40 - 60 mg/dL Final   • LDL Cholesterol  09/08/2021 102 (A) 0 - 100 mg/dL Final   • VLDL Cholesterol 09/08/2021 18  5 - 40 mg/dL Final   • LDL/HDL Ratio 09/08/2021 4.61   Final   • TSH 09/08/2021 1.180  0.270 - 4.200 uIU/mL Final   • WBC 09/08/2021 5.25  3.40 - 10.80 10*3/mm3 Final   • RBC 09/08/2021 4.15  3.77 - 5.28 10*6/mm3 Final   • Hemoglobin 09/08/2021 12.6  12.0 - 15.9 g/dL Final   • Hematocrit 09/08/2021 39.2  34.0 - 46.6 % Final   • MCV 09/08/2021 94.5  79.0 - 97.0 fL Final   • MCH 09/08/2021 30.4  26.6 - 33.0 pg Final   • MCHC 09/08/2021 32.1  31.5 - 35.7 g/dL Final   • RDW 09/08/2021 13.4  12.3 - 15.4 % Final   • RDW-SD 09/08/2021 46.7  37.0 - 54.0 fl Final   • MPV 09/08/2021 10.4  6.0 - 12.0 fL Final   • Platelets 09/08/2021 474 (A) 140 - 450 10*3/mm3 Final   • Neutrophil % 09/08/2021 45.9  42.7 - 76.0 % Final   • Lymphocyte % 09/08/2021 28.0  19.6 - 45.3 % Final   • Monocyte % 09/08/2021 14.3 (A) 5.0 - 12.0 % Final   • Eosinophil % 09/08/2021 9.1 (A) 0.3 - 6.2 % Final   • Basophil % 09/08/2021 2.3 (A) 0.0 - 1.5 % Final   • Immature Grans % 09/08/2021 0.4  0.0 - 0.5 % Final   • Neutrophils, Absolute 09/08/2021 2.41  1.70 - 7.00 10*3/mm3 Final   • Lymphocytes, Absolute 09/08/2021 1.47  0.70 - 3.10 10*3/mm3 Final   • Monocytes, Absolute 09/08/2021 0.75  0.10 - 0.90 10*3/mm3 Final   • Eosinophils, Absolute 09/08/2021 0.48 (A) 0.00 - 0.40 10*3/mm3 Final   • Basophils, Absolute 09/08/2021 0.12  0.00 - 0.20 10*3/mm3 Final   • Immature Grans, Absolute 09/08/2021 0.02  0.00 - 0.05 10*3/mm3 Final   • nRBC 09/08/2021 0.0  0.0 - 0.2 /100 WBC Final       ASSESSMENT/ PLAN:    Diagnoses and all orders for this visit:    1. Low HDL (under 40) (Primary)  -     Comprehensive Metabolic Panel  -     TSH  -     Lipid Panel  -     atorvastatin  (Lipitor) 10 MG tablet; Take 1 tablet by mouth Every Night.  Dispense: 90 tablet; Refill: 3    2. Gastroesophageal reflux disease without esophagitis  -     CBC & Differential    3. Anemia, unspecified type  -     CBC & Differential    4. Gynecologic exam normal  Assessment & Plan:  She presented today for routine gynecological exam.  Her exams have been normal.  She has no specific gynecological concerns or complaints today.  A ThinPrep Pap smear was obtained as well as a routine mammogram.  Her mammogram is not due till the first of this next year.    Orders:  -     Liquid-based Pap Smear, Screening; Future    5. Anxiety  -     citalopram (CeleXA) 40 MG tablet; Take 1 tablet by mouth Daily.  Dispense: 90 tablet; Refill: 3    6. Arthralgia, unspecified joint  -     celecoxib (CeleBREX) 100 MG capsule; Take 1 capsule by mouth Daily.  Dispense: 90 capsule; Refill: 3      Orders Placed Today:     New Medications Ordered This Visit   Medications   • citalopram (CeleXA) 40 MG tablet     Sig: Take 1 tablet by mouth Daily.     Dispense:  90 tablet     Refill:  3   • celecoxib (CeleBREX) 100 MG capsule     Sig: Take 1 capsule by mouth Daily.     Dispense:  90 capsule     Refill:  3   • atorvastatin (Lipitor) 10 MG tablet     Sig: Take 1 tablet by mouth Every Night.     Dispense:  90 tablet     Refill:  3        Management Plan:     An After Visit Summary was printed and given to the patient at discharge.    Follow-up: Return in about 1 year (around 8/18/2023) for Recheck.    Avery Espinosa,  8/18/2022 15:46 EDT  This note was electronically signed.

## 2022-08-19 LAB
ALBUMIN SERPL-MCNC: 4.1 G/DL (ref 3.5–5.2)
ALBUMIN/GLOB SERPL: 1.4 G/DL
ALP SERPL-CCNC: 93 U/L (ref 39–117)
ALT SERPL W P-5'-P-CCNC: 24 U/L (ref 1–33)
ANION GAP SERPL CALCULATED.3IONS-SCNC: 10.9 MMOL/L (ref 5–15)
AST SERPL-CCNC: 29 U/L (ref 1–32)
BILIRUB SERPL-MCNC: 0.4 MG/DL (ref 0–1.2)
BUN SERPL-MCNC: 14 MG/DL (ref 6–20)
BUN/CREAT SERPL: 17.9 (ref 7–25)
CALCIUM SPEC-SCNC: 9.3 MG/DL (ref 8.6–10.5)
CHLORIDE SERPL-SCNC: 102 MMOL/L (ref 98–107)
CHOLEST SERPL-MCNC: 146 MG/DL (ref 0–200)
CO2 SERPL-SCNC: 25.1 MMOL/L (ref 22–29)
CREAT SERPL-MCNC: 0.78 MG/DL (ref 0.57–1)
EGFRCR SERPLBLD CKD-EPI 2021: 89.3 ML/MIN/1.73
GLOBULIN UR ELPH-MCNC: 2.9 GM/DL
GLUCOSE SERPL-MCNC: 81 MG/DL (ref 65–99)
HDLC SERPL-MCNC: 24 MG/DL (ref 40–60)
LDLC SERPL CALC-MCNC: 107 MG/DL (ref 0–100)
LDLC/HDLC SERPL: 4.43 {RATIO}
POTASSIUM SERPL-SCNC: 4.1 MMOL/L (ref 3.5–5.2)
PROT SERPL-MCNC: 7 G/DL (ref 6–8.5)
SODIUM SERPL-SCNC: 138 MMOL/L (ref 136–145)
TRIGL SERPL-MCNC: 78 MG/DL (ref 0–150)
VLDLC SERPL-MCNC: 15 MG/DL (ref 5–40)

## 2022-08-24 LAB
CONV .: NORMAL
CYTOLOGIST CVX/VAG CYTO: NORMAL
CYTOLOGY CVX/VAG DOC CYTO: NORMAL
CYTOLOGY CVX/VAG DOC THIN PREP: NORMAL
DX ICD CODE: NORMAL
HIV 1 & 2 AB SER-IMP: NORMAL
OTHER STN SPEC: NORMAL
STAT OF ADQ CVX/VAG CYTO-IMP: NORMAL

## 2022-09-06 ENCOUNTER — TELEPHONE (OUTPATIENT)
Dept: ORTHOPEDIC SURGERY | Facility: CLINIC | Age: 57
End: 2022-09-06

## 2022-09-06 NOTE — TELEPHONE ENCOUNTER
Caller: HERMANN   Relationship to Patient: SELF     Phone Number: [phone]   Reason for Call: LEFT SHOULDER, RIGHT HIP

## 2022-09-08 ENCOUNTER — OFFICE VISIT (OUTPATIENT)
Dept: ORTHOPEDIC SURGERY | Facility: CLINIC | Age: 57
End: 2022-09-08

## 2022-09-08 VITALS — HEIGHT: 65 IN | BODY MASS INDEX: 26.66 KG/M2 | OXYGEN SATURATION: 94 % | WEIGHT: 160 LBS

## 2022-09-08 DIAGNOSIS — M77.8 LEFT SHOULDER TENDONITIS: ICD-10-CM

## 2022-09-08 DIAGNOSIS — M70.61 TROCHANTERIC BURSITIS OF RIGHT HIP: Primary | ICD-10-CM

## 2022-09-08 DIAGNOSIS — M25.512 LEFT SHOULDER PAIN, UNSPECIFIED CHRONICITY: ICD-10-CM

## 2022-09-08 PROCEDURE — 99213 OFFICE O/P EST LOW 20 MIN: CPT | Performed by: ORTHOPAEDIC SURGERY

## 2022-09-08 PROCEDURE — 20610 DRAIN/INJ JOINT/BURSA W/O US: CPT | Performed by: ORTHOPAEDIC SURGERY

## 2022-09-08 RX ADMIN — TRIAMCINOLONE ACETONIDE 40 MG: 40 INJECTION, SUSPENSION INTRA-ARTICULAR; INTRAMUSCULAR at 15:07

## 2022-09-08 RX ADMIN — LIDOCAINE HYDROCHLORIDE 5 ML: 10 INJECTION, SOLUTION INFILTRATION; PERINEURAL at 15:07

## 2022-09-08 NOTE — PROGRESS NOTES
"Chief Complaint  Pain of the Left Shoulder and Follow-up and Pain of the Right Hip     Subjective      Romelia Young presents to [facility] for evaluation of the left shoulder and follow up evaluation of the right hip.  The patient has been treating her right hip bursitis conservatively. She reports left shoulder pain as well today. She denies specific injury to the shoulder.     No Known Allergies     Social History     Socioeconomic History   • Marital status:    Tobacco Use   • Smoking status: Former Smoker     Packs/day: 0.25     Years: 12.00     Pack years: 3.00     Start date:      Quit date:      Years since quittin.7   • Smokeless tobacco: Never Used   Vaping Use   • Vaping Use: Never used   Substance and Sexual Activity   • Alcohol use: Never   • Drug use: Never   • Sexual activity: Defer        Review of Systems     Objective   Vital Signs:   Ht 165.1 cm (65\")   Wt 72.6 kg (160 lb)   SpO2 94%   BMI 26.63 kg/m²       Physical Exam  Constitutional:       Appearance: Normal appearance. The patient is well-developed and normal weight.   HENT:      Head: Normocephalic.      Right Ear: Hearing and external ear normal.      Left Ear: Hearing and external ear normal.      Nose: Nose normal.   Eyes:      Conjunctiva/sclera: Conjunctivae normal.   Cardiovascular:      Rate and Rhythm: Normal rate.   Pulmonary:      Effort: Pulmonary effort is normal.      Breath sounds: No wheezing or rales.   Abdominal:      Palpations: Abdomen is soft.      Tenderness: There is no abdominal tenderness.   Musculoskeletal:      Cervical back: Normal range of motion.   Skin:     Findings: No rash.   Neurological:      Mental Status: The patient is alert and oriented to person, place, and time.   Psychiatric:         Mood and Affect: Mood and affect normal.         Judgment: Judgment normal.       Ortho Exam      Left shoulder- Forward elevation 180. Abduction 155. External " Rotation 80. Internal rotation 60. 4+ supraspinatus strength, 5/5 infraspinatus  And subscapularis. Internal rotation t-12. Negative lift off. Negative impingement signs. Pain with the posterior lateral shoulder. Negative cross arm adduction.     Right hip- full hip ROM. Leg lengths appear equal. Negative straight leg raise. Tender to the greater trochanter and posterior lateral hip. Neurovascularly intact. Positive EHL, FHL, GS and TA. Sensation intact to all 5 nerves of the foot. Positive pulses. External Rotation 45. Internal rotation 20. Flexion 100.     Left shoulder  Date/Time: 9/8/2022 3:07 PM  Consent given by: patient  Site marked: site marked  Timeout: Immediately prior to procedure a time out was called to verify the correct patient, procedure, equipment, support staff and site/side marked as required   Supporting Documentation  Indications: pain   Procedure Details  Location: shoulder (Left shoulder ) -   Needle gauge: 21G.  Medications administered: 5 mL lidocaine 1 %; 40 mg triamcinolone acetonide 40 MG/ML  Patient tolerance: patient tolerated the procedure well with no immediate complications    Right hip : R greater trochanteric bursa  Date/Time: 9/8/2022 3:07 PM  Consent given by: patient  Site marked: site marked  Timeout: Immediately prior to procedure a time out was called to verify the correct patient, procedure, equipment, support staff and site/side marked as required   Supporting Documentation  Indications: pain   Procedure Details  Location: hip - R greater trochanteric bursa  Needle size: 22 G  Medications administered: 5 mL lidocaine 1 %; 40 mg triamcinolone acetonide 40 MG/ML  Patient tolerance: patient tolerated the procedure well with no immediate complications          X-Ray Report:  Left scapula(s) X-Ray  Indication: Evaluation of left shoulder pain  AP and Lateral view(s)  Findings: intact hardware to the clavicle. No acute fracture to proximal humerus. Mild degenerative changes to  the acromioclavicular joint and glenohumeral joint. scoliosis to the thoracolumber spine.   Prior studies available for comparison: no         Imaging Results (Most Recent)     Procedure Component Value Units Date/Time    XR Scapula Left [[id number]] Resulted: 09/08/22 1425     Updated: 09/08/22 1425    Narrative:      X-Ray Report:  Left scapula(s) X-Ray  Indication: Evaluation of left shoulder pain  AP and Lateral view(s)  Findings: intact hardware to the clavicle. No acute fracture to proximal   humerus. Mild degenerative changes to the acromioclavicular joint and   glenohumeral joint. scoliosis to the thoracolumber spine.   Prior studies available for comparison: no            Result Review :       XR Scapula Left    Result Date: 9/8/2022  Narrative: X-Ray Report: Left scapula(s) X-Ray Indication: Evaluation of left shoulder pain AP and Lateral view(s) Findings: intact hardware to the clavicle. No acute fracture to proximal humerus. Mild degenerative changes to the acromioclavicular joint and glenohumeral joint. scoliosis to the thoracolumber spine. Prior studies available for comparison: no              Assessment and Plan     Diagnoses and all orders for this visit:    1. Trochanteric bursitis of right hip (Primary)    2. Left shoulder pain, unspecified chronicity  -     XR Scapula Left    3. Left shoulder tendonitis        Discussed the treatment plan with the patient.  Discussed the risks and benefits of the right hip and left shoulder steroid injections. The patient expressed understanding and wished to proceed. She tolerated the injections well.     Call or return if worsening symptoms.    Follow Up     PRN      Patient was given instructions and counseling regarding her condition or for health maintenance advice. Please see specific information pulled into the AVS if appropriate.     Scribed for Ephraim Lamb MD by Marcia Christensen.  09/08/22   13:58 EDT    I have personally performed the  services described in this document as scribed by the above individual and it is both accurate and complete. Ephraim Lamb MD 09/11/22

## 2022-09-11 RX ORDER — LIDOCAINE HYDROCHLORIDE 10 MG/ML
5 INJECTION, SOLUTION INFILTRATION; PERINEURAL
Status: COMPLETED | OUTPATIENT
Start: 2022-09-08 | End: 2022-09-08

## 2022-09-11 RX ORDER — TRIAMCINOLONE ACETONIDE 40 MG/ML
40 INJECTION, SUSPENSION INTRA-ARTICULAR; INTRAMUSCULAR
Status: COMPLETED | OUTPATIENT
Start: 2022-09-08 | End: 2022-09-08

## 2022-11-04 ENCOUNTER — TELEPHONE (OUTPATIENT)
Dept: FAMILY MEDICINE CLINIC | Facility: CLINIC | Age: 57
End: 2022-11-04

## 2022-11-04 DIAGNOSIS — Z12.39 ENCOUNTER FOR SCREENING FOR MALIGNANT NEOPLASM OF BREAST, UNSPECIFIED SCREENING MODALITY: Primary | ICD-10-CM

## 2022-11-04 NOTE — TELEPHONE ENCOUNTER
Caller: Romelia Young    Relationship: Self    Best call back number: [phone]    What medication are you requesting: DIPROLENE OINTMENT    What are your current symptoms: IRRITATION, RED AROUND NOSE AND MOUTH DUE TO WEARING MASKS    How long have you been experiencing symptoms: 4-5 MONTHS OFF AND ON      If a prescription is needed, what is your preferred pharmacy and phone number: [facility] [place]     Additional notes:  PATIENT REPORTS AREA GETS BETTER WITHOUT MASKS, BUT GETS WORSE WITH MASKS.  NEVER COMPLETELY GOES AWAY.  PATIENT REPORTS SHE HAS TALKED WITH OTHER NURSES ABOUT THE DIPROLENE OINTMENT, AND IT WORKED FOR THEM, SO SHE WOULD LIKE TO TRY IT.

## 2022-11-04 NOTE — TELEPHONE ENCOUNTER
Caller: Romelia Young    Relationship: Self    Best call back number: [phone]    What orders are you requesting (i.e. lab or imaging): MAMMOGRAM ROUTINE SCREENING    In what timeframe would the patient need to come in: JANUARY    Where will you receive your lab/imaging services: [facility]    Additional notes:PATIENT WOULD LIKE TO SCHEDULE A MAMMOGRAM FOR January FOR ROUTINE SCREENING. PLEASE SCHEDULE WITH PATIENT AND ADVISE.

## 2022-11-07 NOTE — TELEPHONE ENCOUNTER
"Notified pt that she needs to be seen and evaluated for this and she said \"forget it \" and hung up .  "

## 2023-02-13 ENCOUNTER — HOSPITAL ENCOUNTER (OUTPATIENT)
Dept: MAMMOGRAPHY | Facility: HOSPITAL | Age: 58
Discharge: HOME | End: 2023-02-13
Admitting: FAMILY MEDICINE
Payer: COMMERCIAL

## 2023-02-13 DIAGNOSIS — Z12.39 ENCOUNTER FOR SCREENING FOR MALIGNANT NEOPLASM OF BREAST, UNSPECIFIED SCREENING MODALITY: ICD-10-CM

## 2023-02-13 PROCEDURE — 77063 BREAST TOMOSYNTHESIS BI: CPT

## 2023-02-13 PROCEDURE — 77067 SCR MAMMO BI INCL CAD: CPT

## 2023-03-13 ENCOUNTER — OFFICE VISIT (OUTPATIENT)
Dept: PODIATRY | Facility: CLINIC | Age: 58
End: 2023-03-13
Payer: COMMERCIAL

## 2023-03-13 VITALS
DIASTOLIC BLOOD PRESSURE: 89 MMHG | WEIGHT: 161 LBS | TEMPERATURE: 98 F | OXYGEN SATURATION: 95 % | HEART RATE: 61 BPM | SYSTOLIC BLOOD PRESSURE: 132 MMHG | HEIGHT: 65 IN | BODY MASS INDEX: 26.82 KG/M2

## 2023-03-13 DIAGNOSIS — M79.672 FOOT PAIN, LEFT: ICD-10-CM

## 2023-03-13 DIAGNOSIS — B35.1 ONYCHOMYCOSIS: ICD-10-CM

## 2023-03-13 DIAGNOSIS — L03.032 PARONYCHIA OF TOE OF LEFT FOOT: ICD-10-CM

## 2023-03-13 DIAGNOSIS — L60.0 ONYCHOCRYPTOSIS: Primary | ICD-10-CM

## 2023-03-13 PROCEDURE — 99213 OFFICE O/P EST LOW 20 MIN: CPT | Performed by: PODIATRIST

## 2023-03-13 PROCEDURE — 11750 EXCISION NAIL&NAIL MATRIX: CPT | Performed by: PODIATRIST

## 2023-03-13 NOTE — PROGRESS NOTES
[facility] - PODIATRY    Today's Date: 23    Patient Name: Romelia Young  MRN: [id number]  [facility]: [id number]  PCP: Avery Espinosa DO  Referring Provider: No ref. provider found    SUBJECTIVE     Chief Complaint   Patient presents with   • Left Foot - Toe Pain, Nail Problem, Ingrown Toenail     Great toe and second toe      HPI: Romelia Young, a 57 y.o.female, comes to clinic.    New, Established, New Problem:  New problem  Location: , Last PCP Visit: First and second total toenails and surrounding nail borders   Duration:   Greater than 1 month  Onset:  Gradual  Nature:  sore with palpation.  Stable, worsening, improving:   Worsening  Aggravating factors:  Pain with shoe gear and ambulation.  Previous Treatment:  Self debridement    No other pedal complaints at this time.    Patient denies any fevers, chills, nausea, vomiting, shortness of breath, nor any other constitutional signs nor symptoms.       Past Medical History:   Diagnosis Date   • Allergic    • Anemia, unspecified    • Ankle sprain 2018    left   • Anxiety    • Arthritis    • Bursitis of hip, right 04/15/2018   • Bursitis of right shoulder 2018   • Callus    • Chronic allergic rhinitis    • Concussion 2012    Head injury   • GERD (gastroesophageal reflux disease)    • Hemorrhoids    • Hernia, hiatal    • History of transfusion    • Infectious viral hepatitis C antibodies   • Ingrown toenail Past   • Left ankle pain 2018   • Menopause     37 yrs old   • Pain of right hip joint 2018   • Plantar fasciitis Past   • Right shoulder pain 2018   • Seasonal allergies    • Sensitive skin    • Shoulder impingement, right 2018   • Shoulder tendinitis 2015   • Shoulder tendinitis, right 2018   • Trochanteric bursitis, right hip 2018     Past Surgical History:   Procedure Laterality Date   •  SECTION     • CLAVICLE SURGERY  2012    clavicle fracture  2022       Jade Thomas MD  [address]  [address]  [facility] [address]  Via Fax: [phone]      Patient: Zac Greco   YOB: 1963   Date of Visit: 3/22/2022       Dear Dr. Thomas:    Thank you for referring Linus Greco to me for evaluation. Below are my notes for this visit with him.    If you have questions, please do not hesitate to call me. I look forward to following your patient along with you.      Sincerely,        Leroy Florence DO        CC: No Recipients  Leroy Florence DO  3/23/2022 12:33 PM  Signed  Cardiology follow-up note  [address] [address]  Leroy Florence DO     Referred by:   Jade Thomas MD  [address]  [facility] [address]  [place] [address]     Primary care physician: Jade Thomas MD     Date of Service: 3/22/2022       Consultation for Zac Greco  : 1963        Subjective:   Zac Greco is a 58 year old male who  has a past medical history of CKD stage III, HLD, HTN, Hyperthyroidism, and Type 2 DM. and is here for follow up.    Last visit with me was 2021.  We started patient on metoprolol in place of atenolol.  As patient to follow-up with me in 3 months.  Focused on diabetes.  Follow-up with PCP.    Patient has no acute complaints today.  No chest pain.  No shortness of breath.  No lower extremity swelling.  No PND.  No orthopnea.  Prior stress test was normal.  Feeling well.  Activity levels have dropped off a little bit.  Otherwise has no complaints.  Is on Farxiga for his diabetes.  Blood sugars acceptable per the patient.    Tolerating statin.  Blood pressures are acceptable as well.  No dizziness.  No syncopal episodes.  Next  Had long in-depth discussion with patient about changing his combination losartan/surgical thiazide for Entresto.  Patient willing to proceed.  Discussed data surrounding this.  He understands.    Review of Systems   Review of Systems    Constitutional: Negative.    HENT: Negative.    Eyes: Negative.    Respiratory: Negative for chest tightness and shortness of breath.    Cardiovascular: Negative.  Negative for chest pain, palpitations and leg swelling.   Gastrointestinal: Negative.    Endocrine: Negative.    Genitourinary: Negative.    Musculoskeletal: Positive for arthralgias and myalgias.   Skin: Negative.    Allergic/Immunologic: Negative.    Neurological: Negative.  Negative for dizziness, syncope and light-headedness.   Hematological: Negative.    Psychiatric/Behavioral: Negative.         Past Medical History:   Diagnosis Date   • CKD stage III    • HLD    • HTN    • Hyperthyroidism    • Type 2 DM         Past Surgical History:   Procedure Laterality Date   • No past surgeries          Family History   Problem Relation Age of Onset   • Dementia/Alzheimers Mother 85   • Heart disease Father 84   • Patient is unaware of any medical problems Sister         Social History     Tobacco Use   • Smoking status: Former Smoker   • Smokeless tobacco: Never Used   • Tobacco comment: 1/4 pack per day for 25 years.    Substance Use Topics   • Alcohol use: Yes     Comment: very rare. one or two drinks a month    • Drug use: Never        ALLERGIES:  No Known Allergies  Current Outpatient Medications   Medication Sig   • metoPROLOL succinate (TOPROL-XL) 25 MG 24 hr tablet Take 1 tablet by mouth daily.   • rosuvastatin (CRESTOR) 40 MG tablet Take 1 tablet by mouth nightly.   • methIMAzole (TAPAZOLE) 5 MG tablet Take 3 tablets by mouth daily.   • METFORMIN HCL PO Take 750 mg by mouth every morning.    • dapagliflozin (Farxiga) 10 MG tablet Take 10 mg by mouth every morning.   • fenofibrate micronized (LOFIBRA) 134 MG capsule Take 134 mg by mouth daily (before breakfast).   • liraglutide (Victoza) 18 MG/3ML pen-injector Inject 1.8 mg into the skin daily.   • losartan-hydrochlorothiazide (HYZAAR) 100-12.5 MG per tablet Take 1 tablet by mouth daily.   •  surgery - ORIF   • COLONOSCOPY  2017   • ENDOSCOPY     • OTHER SURGICAL HISTORY      Metal Implant   • OTHER SURGICAL HISTORY      Tuboplasty   • TOENAIL EXCISION       Family History   Problem Relation Age of Onset   • Heart disease Mother    • Hypertension Mother    • Arthritis Mother    • Osteoporosis Mother    • Heart disease Father    • Hypertension Father    • Diabetes Father    • Arthritis Father    • Heart disease Sister    • Diabetes Sister    • Urolithiasis Brother      Social History     Socioeconomic History   • Marital status:    Tobacco Use   • Smoking status: Former     Packs/day: 0.25     Years: 12.00     Pack years: 3.00     Types: Cigarettes     Start date: 1983     Quit date: 1995     Years since quittin.2   • Smokeless tobacco: Never   Vaping Use   • Vaping Use: Never used   Substance and Sexual Activity   • Alcohol use: Never   • Drug use: Never   • Sexual activity: Defer     No Known Allergies  Current Outpatient Medications   Medication Sig Dispense Refill   • acetaminophen (TYLENOL) 500 MG tablet Tylenol Extra Strength 500 mg oral tablet take 1 tablet (500 mg) by oral route every 3 hours as needed   Active     • atorvastatin (Lipitor) 10 MG tablet Take 1 tablet by mouth Every Night. 90 tablet 3   • celecoxib (CeleBREX) 100 MG capsule Take 1 capsule by mouth Daily. 90 capsule 3   • cetirizine (zyrTEC) 10 MG tablet Zyrtec oral tablet 10 mg take 1 tablet (10 mg) by oral route once daily   Active     • citalopram (CeleXA) 40 MG tablet Take 1 tablet by mouth Daily. 90 tablet 3   • Docusate Calcium (STOOL SOFTENER PO) Take  by mouth As Needed.     • famotidine (PEPCID) 20 MG tablet Take 1 tablet by mouth.       No current facility-administered medications for this visit.     Review of Systems   Constitutional: Negative.    Skin:        Painful Left in-grown toenail   All other systems reviewed and are negative.      OBJECTIVE     Vitals:    23 1440   BP: 132/89    Pulse: 61   Temp: 98 °F (36.7 °C)   SpO2: 95%       PHYSICAL EXAM  GEN:      Foot/Ankle Exam:       General:   Appearance: appears stated age and healthy    Orientation: AAOx3    Affect: appropriate    Gait: unimpaired    Shoe Gear:  Casual shoes    VASCULAR      Right Foot Vascularity   Normal vascular exam    Dorsalis pedis:  2+  Posterior tibial:  2+  Skin Temperature: warm    Edema Grading:  None  CFT:  < 3 seconds  Pedal Hair Growth:  Present  Varicosities: none       Left Foot Vascularity   Normal vascular exam    Dorsalis pedis:  2+  Posterior tibial:  2+  Skin Temperature: warm    Edema Grading:  None  CFT:  < 3 seconds  Pedal Hair Growth:  Present  Varicosities: none        NEUROLOGIC     Right Foot Neurologic   Normal sensation    Light touch sensation:  Normal  Vibratory sensation:  Normal  Hot/Cold sensation: normal       Left Foot Neurologic   Normal sensation    Light touch sensation:  Normal  Vibratory sensation:  Normal  Hot/cold sensation: normal       MUSCLE STRENGTH     Right Foot Muscle Strength   Foot dorsiflexion:  4  Foot plantar flexion:  4  Foot inversion:  4  Foot eversion:  4     Left Foot Muscle Strength   Foot dorsiflexion:  4  Foot plantar flexion:  4  Foot inversion:  4  Foot eversion:  4     RANGE OF MOTION      Right Foot Range of Motion   Foot and ankle ROM within normal limits       Left Foot Range of Motion   Foot and ankle ROM within normal limits       DERMATOLOGIC     Right Foot Dermatologic   Skin: skin intact    Nails: normal       Left Foot Dermatologic   Skin: skin intact    Nails: ingrown toenail and paronychia    Nails comment:  Left 1st and second total toenails and surrounding nail borders      ASSESSMENT/PLAN     Diagnoses and all orders for this visit:    1. Onychocryptosis (Primary)    2. Onychomycosis    3. Foot pain, left    4. Paronychia of toe of left foot      Comprehensive lower extremity examination and evaluation was performed.    Discussed findings and  sertraline (ZOLOFT) 50 MG tablet Take 50 mg by mouth daily.   • zolpidem (AMBIEN) 10 MG tablet Take 10 mg by mouth nightly as needed for Sleep.   • aspirin 81 MG chewable tablet Chew 81 mg by mouth daily.   • HUMULIN R U-500 KWIKPEN 500 UNIT/ML pen-injector INJECT 100 UNITS WITH BREAKFAST, 100 UNITS  WITH  LUNCH  UNIT WITH     DINNER SUBCUTANEOUSLY. DOSE INCREASE, THE ONLY INSULIN PATIENT SHOULD BE TAKING IS U-500. PATIENT NEEDS APPOINTMENT FOR FURTHER REFILLS (Patient taking differently: Inject 90 Units into the skin 3 times daily (before meals). )   • Continuous Blood Gluc  (FREESTYLE LIZZIE 14 DAY READER) Device 1 each as directed.   • Continuous Blood Gluc Sensor (FREESTYLE LIZZIE 14 DAY SENSOR) Misc 1 each every 14 days.   • nortriptyline (PAMELOR) 75 MG capsule Take 75 mg by mouth nightly.      No current facility-administered medications for this visit.        Objective:     Physical Exam:   Physical Exam  Constitutional:       Appearance: He is well-developed. He is obese. He is not ill-appearing.   HENT:      Head: Normocephalic and atraumatic.   Eyes:      General: No scleral icterus.  Neck:      Vascular: No carotid bruit or JVD.   Cardiovascular:      Rate and Rhythm: Normal rate and regular rhythm.      Pulses: Intact distal pulses.      Heart sounds: Normal heart sounds. No murmur heard.    No friction rub. No gallop.   Pulmonary:      Effort: Pulmonary effort is normal. No respiratory distress.      Breath sounds: Normal breath sounds. No wheezing or rales.   Chest:      Chest wall: No tenderness.   Abdominal:      General: Bowel sounds are normal. There is no distension.      Palpations: Abdomen is soft.   Musculoskeletal:         General: No swelling.   Skin:     General: Skin is warm and dry.      Coloration: Skin is not pale.      Findings: No erythema or rash.   Neurological:      Mental Status: He is alert and oriented to person, place, and time.   Psychiatric:         Behavior:  Behavior normal.         Thought Content: Thought content normal.         Judgment: Judgment normal.       Visit Vitals  /66 (BP Location: LUE - Left upper extremity, Patient Position: Sitting, Cuff Size: Regular)   Pulse (!) 111   Ht 6' 3\" (1.905 m)   Wt 119.8 kg (264 lb 1.8 oz)   SpO2 96%   BMI 33.01 kg/m²     Wt Readings from Last 4 Encounters:   22 119.8 kg (264 lb 1.8 oz)   21 120 kg (264 lb 8.8 oz)   21 116.6 kg (257 lb)   21 117 kg (257 lb 15 oz)        Labs:  Cholesterol (mg/dL)   Date Value   10/05/2021 93     HDL (mg/dL)   Date Value   10/05/2021 19 (L)     Cholesterol/ HDL Ratio (no units)   Date Value   10/05/2021 4.9 (H)     Triglycerides (mg/dL)   Date Value   10/05/2021 619 (H)     CALCULATED LDL (mg/dL)   Date Value   2019 20        No results found for: HGBA1C     WBC (K/mcL)   Date Value   10/05/2021 5.6     RBC (mil/mcL)   Date Value   10/05/2021 4.96     HCT (%)   Date Value   10/05/2021 41.2     HGB (g/dL)   Date Value   10/05/2021 13.6     PLT (K/mcL)   Date Value   10/05/2021 188        Sodium (mmol/L)   Date Value   10/05/2021 134 (L)     Potassium (mmol/L)   Date Value   10/05/2021 4.2     Chloride (mmol/L)   Date Value   10/05/2021 101     Glucose (mg/dL)   Date Value   10/05/2021 242 (H)     Calcium (mg/dL)   Date Value   10/05/2021 9.0     Carbon Dioxide (mmol/L)   Date Value   10/05/2021 24     BUN (mg/dL)   Date Value   10/05/2021 35 (H)     Creatinine (mg/dL)   Date Value   10/05/2021 1.43 (H)        GOT/AST (Units/L)   Date Value   10/04/2021 45 (H)     GPT/ALT (Units/L)   Date Value   10/04/2021 59     No results found for: GGTP  Alkaline Phosphatase (Units/L)   Date Value   10/04/2021 52     Bilirubin, Total (mg/dL)   Date Value   10/04/2021 0.8        Imagin2021, arterial ultrasound, normal triphasic waveforms.  10/4/2021, EKG, sinus tachycardia, nonspecific ST-T wave changes,.  Rightward axis.  10/4/2021, venous ultrasound, negative  treatment plan including risks, benefits, and treatment options with patient in detail. Patient agreed with treatment plan.    Phenol and Alcohol Chemical Matrixectomy Procedure - This procedure is indicated for onychocryptosis of the left first and second total nail plates and surrounding nail border(s). Indications, risks and benefits and alternative treatments have been discussed with this patient who has agreed to this procedure. The area was sterilely prepped with a povidone-iodine solution. The affected area was locally anesthetized with 3 ml, of 0.5% Marcaine plain. The offending nail plate was completely excised.  Next 3 applications of 89% phenol were applied to the matrix area x 30 seconds followed by irrigation with copious amounts of isopropyl alcohol.  A sterile dressing was applied. The patient tolerated the procedure well.     Patient was given post procedure care instructions.  The patient states understanding and agreement with this plan.    An After Visit Summary was printed and given to the patient at discharge, including (if requested) any available informative/educational handouts regarding diagnosis, treatment, or medications. All questions were answered to patient/family satisfaction. Should symptoms fail to improve or worsen they agree to call or return to clinic or to go to the Emergency Department. Discussed the importance of following up with any needed screening tests/labs/specialist appointments and any requested follow-up recommended by me today. Importance of maintaining follow-up discussed and patient accepts that missed appointments can delay diagnosis and potentially lead to worsening of conditions.    Return in about 2 weeks (around 3/27/2023) for Post-Procedure., or sooner if acute issues arise.    This document has been electronically signed by Bienvenido Travis DPM on March 13, 2023 15:59 EDT        for DVT  10/5/2021, TTE, mild LVH, mildly reduced global hypokinesis, G2 DD, EF 48%.  No significant valve disease.  11/16/2021, NM stress test, normal perfusion.  Patient worked out for 6 minutes.  87% MPHR.  EKG nondiagnostic secondary to resting ST changes.        Impression/Report/Plan:  In summary, Mr. Greco is a 58 year old male with past medical history significant for  has a past medical history of CKD stage III, HLD, HTN, Hyperthyroidism, and Type 2 DM. who presents for hospital follow-up.    #  Chronic systolic congestive heart failure: Recently diagnosed.  EF 49% euvolemic.  NYHA class I.  --Stress test reassuring..  EKG with no acute findings.    -- BB/losartan.  Euvolemic on exam.  On Farxiga as well.  Ideally will transition to Entresto.  #  Hypertension: Losartan/hydrochlorthiazide.  100/12.5.  Metoprolol 25 mg extended release.  Controlled.  #  Hyperlipidemia/hypertriglyceridemia: Rosuvastatin 40 mg mg/day.  Fenofibrate 134 mg. Improved.  10/7/2021 lipid panel, , HDL 23, TC 98, LDL 47.  Care Everywhere labs.  Triglycerides have clinically improved with fasting  #  Diabetes complicated by CKD stage III/hyperglycemia/neuropathy/hypothyroidism: Improving.  Currently on insulin, Victoza and Farxiga.  Stable.  #Hypothyroidism: Mildly tachycardic today.  Has follow-up with endocrinology.  Stable methimazole dose.      Diagnoses Today:   1. HFrEF (heart failure with reduced ejection fraction) (CMS/[facility])    2. Type 2 diabetes mellitus with hyperosmolarity without coma, with long-term current use of insulin (CMS/[facility])    3. Mixed hyperlipidemia    4. HTN         Recommendations:  1.  Continue metoprolol, Farxiga, statin at current doses.  Continue fenofibrate.  2.  Stressed continue dietary changes.  Improvement in diabetes.  3.  Continue aspirin 81 mg.  4.  Stop his combination losartan/hydrochlorthiazide.  2-day washout period.  Start Entresto 49/51.  GDMT.  Will titrate up as blood pressure  allows.  5.  Also asked patient to discuss his hyperthyroidism with endocrinologist.  May need higher dose of methimazole.  Suspect some component of his tachycardia is deleterious to his heart function.    30 minutes in total spent in the care of patient today, precharting, review of recent stress test, extensive discussion regarding congestive heart failure with the patient including counseling on fluid and salt restriction, greater than 50% of the time, medication compliance, visit, completion of note.    Orders Placed This Encounter   • sacubitril-valsartan (Entresto) 49-51 MG per tablet       Leroy Florence DO

## 2023-03-27 ENCOUNTER — OFFICE VISIT (OUTPATIENT)
Dept: PODIATRY | Facility: CLINIC | Age: 58
End: 2023-03-27
Payer: COMMERCIAL

## 2023-03-27 VITALS
DIASTOLIC BLOOD PRESSURE: 88 MMHG | SYSTOLIC BLOOD PRESSURE: 144 MMHG | OXYGEN SATURATION: 97 % | WEIGHT: 161 LBS | HEIGHT: 65 IN | TEMPERATURE: 97.1 F | HEART RATE: 70 BPM | BODY MASS INDEX: 26.82 KG/M2

## 2023-03-27 DIAGNOSIS — L03.032 PARONYCHIA OF TOE OF LEFT FOOT: ICD-10-CM

## 2023-03-27 DIAGNOSIS — M79.672 FOOT PAIN, LEFT: ICD-10-CM

## 2023-03-27 DIAGNOSIS — L60.0 ONYCHOCRYPTOSIS: Primary | ICD-10-CM

## 2023-03-27 PROCEDURE — 99212 OFFICE O/P EST SF 10 MIN: CPT | Performed by: PODIATRIST

## 2023-03-27 NOTE — PROGRESS NOTES
[facility] - PODIATRY    Today's Date: 23    Patient Name: Romelia Young  MRN: [id number]  CSN: [id number]  PCP: Avery Espinosa DO  Referring Provider: No ref. provider found    SUBJECTIVE     Chief Complaint   Patient presents with   • Left Foot - Follow-up, Avulsion     Patient is here for toenail avulsion on her great toe and second digit.      HPI: Romelia Young, a 57 y.o.female, comes to clinic.    New, Established, New Problem:  Established  Location: First and second total toenails and surrounding nail borders   Duration:   Greater than 1 month  Onset:  Gradual  Nature:  sore with palpation.  Stable, worsening, improving:   Improving  Aggravating factors:  Pain with shoe gear and ambulation.  Previous Treatment:  Chemical matrixectomy to affected nails.    Patient denies any fevers, chills, nausea, vomiting, shortness of breath, nor any other constitutional signs nor symptoms.       Past Medical History:   Diagnosis Date   • Allergic    • Anemia, unspecified    • Ankle sprain 2018    left   • Anxiety    • Arthritis    • Bursitis of hip, right 04/15/2018   • Bursitis of right shoulder 2018   • Callus    • Chronic allergic rhinitis    • Concussion 2012    Head injury   • GERD (gastroesophageal reflux disease)    • Hemorrhoids    • Hernia, hiatal    • History of transfusion    • Infectious viral hepatitis C antibodies   • Ingrown toenail Past   • Left ankle pain 2018   • Menopause     37 yrs old   • Pain of right hip joint 2018   • Plantar fasciitis Past   • Right shoulder pain 2018   • Seasonal allergies    • Sensitive skin    • Shoulder impingement, right 2018   • Shoulder tendinitis 2015   • Shoulder tendinitis, right 2018   • Trochanteric bursitis, right hip 2018     Past Surgical History:   Procedure Laterality Date   •  SECTION     • CLAVICLE SURGERY      clavicle fracture surgery - [facility]    • COLONOSCOPY  2017   • ENDOSCOPY     • OTHER SURGICAL HISTORY      Metal Implant   • OTHER SURGICAL HISTORY      Tuboplasty   • TOENAIL EXCISION       Family History   Problem Relation Age of Onset   • Heart disease Mother    • Hypertension Mother    • Arthritis Mother    • Osteoporosis Mother    • Heart disease Father    • Hypertension Father    • Diabetes Father    • Arthritis Father    • Heart disease Sister    • Diabetes Sister    • Urolithiasis Brother      Social History     Socioeconomic History   • Marital status:    Tobacco Use   • Smoking status: Former     Packs/day: 0.25     Years: 12.00     Pack years: 3.00     Types: Cigarettes     Start date: 1983     Quit date: 1995     Years since quittin.2   • Smokeless tobacco: Never   Vaping Use   • Vaping Use: Never used   Substance and Sexual Activity   • Alcohol use: Never   • Drug use: Never   • Sexual activity: Defer     No Known Allergies  Current Outpatient Medications   Medication Sig Dispense Refill   • acetaminophen (TYLENOL) 500 MG tablet Tylenol Extra Strength 500 mg oral tablet take 1 tablet (500 mg) by oral route every 3 hours as needed   Active     • amoxicillin (AMOXIL) 875 MG tablet Take 1 tablet by mouth twice a day for 7 days 14 tablet 0   • atorvastatin (Lipitor) 10 MG tablet Take 1 tablet by mouth Every Night. 90 tablet 3   • celecoxib (CeleBREX) 100 MG capsule Take 1 capsule by mouth Daily. 90 capsule 3   • cetirizine (zyrTEC) 10 MG tablet Zyrtec oral tablet 10 mg take 1 tablet (10 mg) by oral route once daily   Active     • citalopram (CeleXA) 40 MG tablet Take 1 tablet by mouth Daily. 90 tablet 3   • Docusate Calcium (STOOL SOFTENER PO) Take  by mouth As Needed.     • famotidine (PEPCID) 20 MG tablet Take 1 tablet by mouth.     • predniSONE (DELTASONE) 5 MG tablet Take as directed per paper instructions 21 tablet 0   • promethazine (PHENERGAN) 12.5 MG tablet Take 1 tablet by mouth every 6 hours as needed 20  tablet 0     No current facility-administered medications for this visit.     Review of Systems   Constitutional: Negative.    Skin:        Follow-up for painful Left in-grown toenails   All other systems reviewed and are negative.      OBJECTIVE     Vitals:    03/27/23 1301   BP: 144/88   Pulse:    Temp:    SpO2:        PHYSICAL EXAM  GEN:      Foot/Ankle Exam:       General:   Appearance: appears stated age and healthy    Orientation: AAOx3    Affect: appropriate    Gait: unimpaired    Shoe Gear:  Casual shoes    VASCULAR      Right Foot Vascularity   Normal vascular exam    Dorsalis pedis:  2+  Posterior tibial:  2+  Skin Temperature: warm    Edema Grading:  None  CFT:  < 3 seconds  Pedal Hair Growth:  Present  Varicosities: none       Left Foot Vascularity   Normal vascular exam    Dorsalis pedis:  2+  Posterior tibial:  2+  Skin Temperature: warm    Edema Grading:  None  CFT:  < 3 seconds  Pedal Hair Growth:  Present  Varicosities: none        NEUROLOGIC     Right Foot Neurologic   Normal sensation    Light touch sensation:  Normal  Vibratory sensation:  Normal  Hot/Cold sensation: normal       Left Foot Neurologic   Normal sensation    Light touch sensation:  Normal  Vibratory sensation:  Normal  Hot/cold sensation: normal       MUSCLE STRENGTH     Right Foot Muscle Strength   Foot dorsiflexion:  4  Foot plantar flexion:  4  Foot inversion:  4  Foot eversion:  4     Left Foot Muscle Strength   Foot dorsiflexion:  4  Foot plantar flexion:  4  Foot inversion:  4  Foot eversion:  4     RANGE OF MOTION      Right Foot Range of Motion   Foot and ankle ROM within normal limits       Left Foot Range of Motion   Foot and ankle ROM within normal limits       DERMATOLOGIC     Right Foot Dermatologic   Skin: skin intact    Nails: normal       Left Foot Dermatologic   Skin: skin intact    Nails: no ingrown toenail and no paronychia    Nails comment:  Left 1st and second total toenails and surrounding nail borders; healing  without complications      ASSESSMENT/PLAN     Diagnoses and all orders for this visit:    1. Onychocryptosis (Primary)    2. Foot pain, left    3. Paronychia of toe of left foot      Comprehensive lower extremity examination and evaluation was performed.    Discussed findings and treatment plan including risks, benefits, and treatment options with patient in detail. Patient agreed with treatment plan.    Patient is to monitor for recurrence and any new symptoms and to contact Dr. Travis's office for a follow-up appointment.      The patient states understanding and agreement with this plan.    An After Visit Summary was printed and given to the patient at discharge, including (if requested) any available informative/educational handouts regarding diagnosis, treatment, or medications. All questions were answered to patient/family satisfaction. Should symptoms fail to improve or worsen they agree to call or return to clinic or to go to the Emergency Department. Discussed the importance of following up with any needed screening tests/labs/specialist appointments and any requested follow-up recommended by me today. Importance of maintaining follow-up discussed and patient accepts that missed appointments can delay diagnosis and potentially lead to worsening of conditions.    Return if symptoms worsen or fail to improve., or sooner if acute issues arise.    This document has been electronically signed by Bienvenido Travis DPM on March 27, 2023 13:37 EDT

## 2023-04-04 ENCOUNTER — OFFICE VISIT (OUTPATIENT)
Dept: FAMILY MEDICINE CLINIC | Facility: CLINIC | Age: 58
End: 2023-04-04
Payer: COMMERCIAL

## 2023-04-04 VITALS
BODY MASS INDEX: 26.56 KG/M2 | HEIGHT: 65 IN | WEIGHT: 159.4 LBS | DIASTOLIC BLOOD PRESSURE: 92 MMHG | SYSTOLIC BLOOD PRESSURE: 162 MMHG | OXYGEN SATURATION: 97 % | HEART RATE: 64 BPM

## 2023-04-04 DIAGNOSIS — J01.00 ACUTE NON-RECURRENT MAXILLARY SINUSITIS: Primary | ICD-10-CM

## 2023-04-04 DIAGNOSIS — M62.838 MUSCLE SPASMS OF NECK: ICD-10-CM

## 2023-04-04 DIAGNOSIS — R03.0 ELEVATED BP WITHOUT DIAGNOSIS OF HYPERTENSION: ICD-10-CM

## 2023-04-04 RX ORDER — AMOXICILLIN AND CLAVULANATE POTASSIUM 875; 125 MG/1; MG/1
1 TABLET, FILM COATED ORAL 2 TIMES DAILY
Qty: 20 TABLET | Refills: 0 | Status: SHIPPED | OUTPATIENT
Start: 2023-04-04 | End: 2023-04-14

## 2023-04-04 RX ORDER — METAXALONE 800 MG/1
800 TABLET ORAL 3 TIMES DAILY PRN
Qty: 90 TABLET | Refills: 1 | Status: SHIPPED | OUTPATIENT
Start: 2023-04-04

## 2023-04-04 NOTE — PROGRESS NOTES
Chief Complaint  Sinusitis (, started with chills and fever, tested negative for everything ), Nasal Congestion, and Cough (Productive cough, dark yellow/bluish tinge mucous )    Subjective          Romelia Young is a 57 y.o. female who presents to [facility]    History of Present Illness  3/17/23-went to Urgent Care- tested negative for Covid, flu and strep throat. Then a week later went to [facility]-was given Amoxil 875 for 7 days and Prednisone  Three days ago started nasal congestion, cough, body aches.        PHQ-2 Total Score:     PHQ-9 Total Score:          Review of Systems       Medical History: has a past medical history of Allergic, Anemia, unspecified, Ankle sprain (2018), Anxiety, Arthritis, Bursitis of hip, right (04/15/2018), Bursitis of right shoulder (2018), Callus, Chronic allergic rhinitis, Concussion (2012), GERD (gastroesophageal reflux disease), Hemorrhoids, Hernia, hiatal, History of transfusion, Infectious viral hepatitis (C antibodies), Ingrown toenail (Past), Left ankle pain (2018), Menopause, Pain of right hip joint (2018), Plantar fasciitis (Past), Right shoulder pain (2018), Seasonal allergies, Sensitive skin, Shoulder impingement, right (2018), Shoulder tendinitis (2015), Shoulder tendinitis, right (2018), and Trochanteric bursitis, right hip (2018).     Surgical History: has a past surgical history that includes Other surgical history; Clavicle surgery (); Esophagogastroduodenoscopy; Other surgical history;  section (); Colonoscopy (2017); and Toenail excision.     Family History: family history includes Arthritis in her father and mother; Diabetes in her father and sister; Heart disease in her father, mother, and sister; Hypertension in her father and mother; Osteoporosis in her mother; Urolithiasis in her brother.     Social History: reports that she  quit smoking about 28 years ago. Her smoking use included cigarettes. She started smoking about 40 years ago. She has a 3.00 pack-year smoking history. She has never used smokeless tobacco. She reports that she does not drink alcohol and does not use drugs.    Allergies: Patient has no known allergies.      Health Maintenance Due   Topic Date Due   • TDAP/TD VACCINES (1 - Tdap) Never done   • ZOSTER VACCINE (1 of 2) Never done   • HEPATITIS C SCREENING  Never done   • ANNUAL PHYSICAL  Never done   • COVID-19 Vaccine (4 - Booster) 02/11/2022            Current Outpatient Medications:   •  atorvastatin (Lipitor) 10 MG tablet, Take 1 tablet by mouth Every Night., Disp: 90 tablet, Rfl: 3  •  celecoxib (CeleBREX) 100 MG capsule, Take 1 capsule by mouth Daily., Disp: 90 capsule, Rfl: 3  •  cetirizine (zyrTEC) 10 MG tablet, Zyrtec oral tablet 10 mg take 1 tablet (10 mg) by oral route once daily   Active, Disp: , Rfl:   •  citalopram (CeleXA) 40 MG tablet, Take 1 tablet by mouth Daily., Disp: 90 tablet, Rfl: 3  •  Docusate Calcium (STOOL SOFTENER PO), Take  by mouth As Needed., Disp: , Rfl:   •  famotidine (PEPCID) 20 MG tablet, Take 1 tablet by mouth., Disp: , Rfl:   •  promethazine (PHENERGAN) 12.5 MG tablet, Take 1 tablet by mouth every 6 hours as needed, Disp: 20 tablet, Rfl: 0  •  amoxicillin-clavulanate (Augmentin) 875-125 MG per tablet, Take 1 tablet by mouth 2 (Two) Times a Day for 10 days., Disp: 20 tablet, Rfl: 0  •  metaxalone (Skelaxin) 800 MG tablet, Take 1 tablet by mouth 3 (Three) Times a Day As Needed for Muscle Spasms., Disp: 90 tablet, Rfl: 1      Immunization History   Administered Date(s) Administered   • COVID-19 (MODERNA) 1st, 2nd, 3rd Dose Only 01/22/2021, 02/18/2021   • COVID-19 (MODERNA) BOOSTER 12/17/2021   • Flu Vaccine Intradermal Quad 18-64YR 10/01/2021         Objective       Vitals:    04/04/23 1131   BP: 162/92   BP Location: Left arm   Patient Position: Sitting   Cuff Size: Adult   Pulse: 64  "  SpO2: 97%   Weight: 72.3 kg (159 lb 6.4 oz)   Height: 165.1 cm (65\")   PainSc:   6   PainLoc: Head      Body mass index is 26.53 kg/m².   Wt Readings from Last 3 Encounters:   04/04/23 72.3 kg (159 lb 6.4 oz)   03/27/23 73 kg (161 lb)   03/16/23 73.1 kg (161 lb 3.2 oz)      BP Readings from Last 3 Encounters:   04/04/23 162/92   03/27/23 144/88   03/16/23 131/78        Physical Exam  Vitals reviewed.   Constitutional:       Appearance: Normal appearance.   HENT:      Head: Normocephalic and atraumatic.      Right Ear: Tympanic membrane is scarred and bulging.      Left Ear: Tympanic membrane is scarred and bulging.      Nose:      Right Turbinates: Enlarged.      Left Turbinates: Enlarged.      Mouth/Throat:      Pharynx: Pharyngeal swelling, oropharyngeal exudate and posterior oropharyngeal erythema present.   Eyes:      Conjunctiva/sclera: Conjunctivae normal.      Pupils: Pupils are equal, round, and reactive to light.   Cardiovascular:      Rate and Rhythm: Normal rate and regular rhythm.      Pulses: Normal pulses.      Heart sounds: Normal heart sounds.   Pulmonary:      Effort: Pulmonary effort is normal.      Breath sounds: Normal breath sounds.   Abdominal:      General: Bowel sounds are normal.      Palpations: Abdomen is soft.   Musculoskeletal:      Cervical back: Normal range of motion.   Lymphadenopathy:      Head:      Right side of head: Submandibular adenopathy present.      Left side of head: Submandibular adenopathy present.   Skin:     General: Skin is warm and dry.   Neurological:      Mental Status: She is alert and oriented to person, place, and time.   Psychiatric:         Mood and Affect: Mood normal.         Behavior: Behavior normal.         Thought Content: Thought content normal.         Judgment: Judgment normal.             Result Review :       Common labs    Common Labs 8/18/22 8/18/22 8/18/22    1604 1604 1604   Glucose  81    BUN  14    Creatinine  0.78    Sodium  138  "   Potassium  4.1    Chloride  102    Calcium  9.3    Albumin  4.10    Total Bilirubin  0.4    Alkaline Phosphatase  93    AST (SGOT)  29    ALT (SGPT)  24    WBC 6.14     Hemoglobin 12.2     Hematocrit 36.7     Platelets 436     Total Cholesterol   146   Triglycerides   78   HDL Cholesterol   24 (A)   LDL Cholesterol    107 (A)   (A) Abnormal value                             Assessment and Plan        Diagnoses and all orders for this visit:    1. Acute non-recurrent maxillary sinusitis (Primary)  -     amoxicillin-clavulanate (Augmentin) 875-125 MG per tablet; Take 1 tablet by mouth 2 (Two) Times a Day for 10 days.  Dispense: 20 tablet; Refill: 0    2. Elevated BP without diagnosis of hypertension    3. Muscle spasms of neck  -     metaxalone (Skelaxin) 800 MG tablet; Take 1 tablet by mouth 3 (Three) Times a Day As Needed for Muscle Spasms.  Dispense: 90 tablet; Refill: 1    Patient advised to monitor blood pressure at home and journal readings.  Patient informed that a blood pressure reading at home of more than 130/80 is considered hypertension.  For readings greater than 140/90 or higher patient is advised to follow-up in the office with readings for management.  Patient advised to limit sodium intake.      Follow Up     Return in about 3 months (around 7/4/2023), or if symptoms worsen or fail to improve, for Follow up with Dr Espinosa.    Patient was given instructions and counseling regarding her condition or for health maintenance advice. Please see specific information pulled into the AVS if appropriate.     KAITLYN Landry

## 2023-04-07 ENCOUNTER — TELEPHONE (OUTPATIENT)
Dept: FAMILY MEDICINE CLINIC | Facility: CLINIC | Age: 58
End: 2023-04-07
Payer: COMMERCIAL

## 2023-04-07 RX ORDER — FLUCONAZOLE 150 MG/1
150 TABLET ORAL ONCE
Qty: 1 TABLET | Refills: 0 | Status: SHIPPED | OUTPATIENT
Start: 2023-04-07 | End: 2023-04-12

## 2023-04-07 NOTE — TELEPHONE ENCOUNTER
"Caller: Romelia Young \"DEDE\"    Relationship: Self    Best call back number: [phone]    What medication are you requesting: DIFLUCAN    What are your current symptoms: PATIENT WAS PRESCRIBED ANTIBIOTICS     Have you had these symptoms before:    [x] Yes  [] No    Have you been treated for these symptoms before:   [x] Yes  [] No    If a prescription is needed, what is your preferred pharmacy and phone number:  [facility] - [place]  [phone]      "

## 2023-08-24 ENCOUNTER — OFFICE VISIT (OUTPATIENT)
Dept: FAMILY MEDICINE CLINIC | Facility: CLINIC | Age: 58
End: 2023-08-24
Payer: COMMERCIAL

## 2023-08-24 VITALS
BODY MASS INDEX: 26.96 KG/M2 | SYSTOLIC BLOOD PRESSURE: 146 MMHG | TEMPERATURE: 97.9 F | HEART RATE: 84 BPM | OXYGEN SATURATION: 96 % | DIASTOLIC BLOOD PRESSURE: 90 MMHG | WEIGHT: 161.8 LBS | HEIGHT: 65 IN

## 2023-08-24 DIAGNOSIS — Z01.419 ENCOUNTER FOR ANNUAL ROUTINE GYNECOLOGICAL EXAMINATION: Primary | ICD-10-CM

## 2023-08-24 DIAGNOSIS — Z12.39 SCREENING BREAST EXAMINATION: ICD-10-CM

## 2023-08-24 DIAGNOSIS — I10 PRIMARY HYPERTENSION: ICD-10-CM

## 2023-08-24 PROCEDURE — G0123 SCREEN CERV/VAG THIN LAYER: HCPCS | Performed by: FAMILY MEDICINE

## 2023-08-24 RX ORDER — LISINOPRIL 5 MG/1
5 TABLET ORAL DAILY
Qty: 90 TABLET | Refills: 1 | Status: SHIPPED | OUTPATIENT
Start: 2023-08-24

## 2023-08-24 NOTE — ASSESSMENT & PLAN NOTE
Her exam today is normal.  We obtained a routine ThinPrep Pap smear.  Encouraged her that she needs to do her routine gynecological exam every 2 to 3 years but still annual mammograms.

## 2023-08-24 NOTE — PROGRESS NOTES
Chief Complaint   Patient presents with    Gynecologic Exam        Subjective     Romeliacristo Young  has a past medical history of Allergic, Anemia, unspecified, Ankle sprain (2018), Anxiety, Arthritis, Bursitis of hip, right (04/15/2018), Bursitis of right shoulder (2018), Callus, Chronic allergic rhinitis, Concussion (2012), GERD (gastroesophageal reflux disease), Hemorrhoids, Hernia, hiatal, History of transfusion, Infectious viral hepatitis (C antibodies), Ingrown toenail (Past), Left ankle pain (2018), Menopause, Pain of right hip joint (2018), Plantar fasciitis (Past), Right shoulder pain (2018), Seasonal allergies, Sensitive skin, Shoulder impingement, right (2018), Shoulder tendinitis (2015), Shoulder tendinitis, right (2018), and Trochanteric bursitis, right hip (2018).    Elevated blood pressure-she does check her blood pressure at home and at work.  She states more times often than not it is greater than 140/90.  She has had the increased prevalence of headaches as well.    Routine gynecological exam-she is a  female.  She had menarche at the age of 12 and menopause at the age of 37.  She has had some very mild hot flashes more so some mood irritability.  She does have slightly decreased sexual desire and vaginal dryness.      PHQ-2 Depression Screening  Little interest or pleasure in doing things?     Feeling down, depressed, or hopeless?     PHQ-2 Total Score     PHQ-9 Depression Screening  Little interest or pleasure in doing things?     Feeling down, depressed, or hopeless?     Trouble falling or staying asleep, or sleeping too much?     Feeling tired or having little energy?     Poor appetite or overeating?     Feeling bad about yourself - or that you are a failure or have let yourself or your family down?     Trouble concentrating on things, such as reading the newspaper or watching television?     Moving or speaking so slowly that  other people could have noticed? Or the opposite - being so fidgety or restless that you have been moving around a lot more than usual?     Thoughts that you would be better off dead, or of hurting yourself in some way?     PHQ-9 Total Score     If you checked off any problems, how difficult have these problems made it for you to do your work, take care of things at home, or get along with other people?       No Known Allergies    Prior to Admission medications    Medication Sig Start Date End Date Taking? Authorizing Provider   acyclovir (ZOVIRAX) 5 % ointment Use as needed for cold sores. 8/2/23  Yes    atorvastatin (Lipitor) 10 MG tablet Take 1 tablet by mouth Every Night. 8/18/22  Yes Avery Espinosa DO   celecoxib (CeleBREX) 100 MG capsule Take 1 capsule by mouth Daily. 8/18/22  Yes Avery Espinosa DO   cetirizine (zyrTEC) 10 MG tablet Zyrtec oral tablet 10 mg take 1 tablet (10 mg) by oral route once daily   Active   Yes Erwin Mason MD   citalopram (CeleXA) 40 MG tablet Take 1 tablet by mouth Daily. 8/18/22  Yes Avery Espinosa DO   Docusate Calcium (STOOL SOFTENER PO) Take  by mouth As Needed.   Yes Erwin Mason MD   famotidine (PEPCID) 20 MG tablet Take 1 tablet by mouth.   Yes Erwin Mason MD   metaxalone (Skelaxin) 800 MG tablet Take 1 tablet by mouth 3 (Three) Times a Day As Needed for Muscle Spasms. 4/4/23  Yes Wen Loaiza APRN   promethazine (PHENERGAN) 12.5 MG tablet Take 1 tablet by mouth every 6 hours as needed 3/20/23  Yes         Patient Active Problem List   Diagnosis    Trochanteric bursitis    Bilateral hip pain    GERD (gastroesophageal reflux disease)    Anemia    Bursitis    Disorder of bursae and tendons in shoulder region    Dupuytren contracture    Left ankle pain    Right shoulder pain    Chronic allergic rhinitis    Shoulder impingement, right    Sprain of ankle    Trochanteric bursitis of right hip    Pain of right hip joint     Bursitis of shoulder    Anxiety    Low HDL (under 40)    Anemia, unspecified    Encounter for annual routine gynecological examination    Screening breast examination    Hypertension        Past Surgical History:   Procedure Laterality Date     SECTION  2002    CLAVICLE SURGERY  2012    clavicle fracture surgery - [facility]    COLONOSCOPY  2017    ENDOSCOPY      OTHER SURGICAL HISTORY      Metal Implant    OTHER SURGICAL HISTORY      Tuboplasty    TOENAIL EXCISION         Social History     Socioeconomic History    Marital status:    Tobacco Use    Smoking status: Former     Packs/day: 0.25     Years: 12.00     Pack years: 3.00     Types: Cigarettes     Start date: 1983     Quit date: 1995     Years since quittin.6    Smokeless tobacco: Never   Vaping Use    Vaping Use: Never used   Substance and Sexual Activity    Alcohol use: Never    Drug use: Never    Sexual activity: Defer       Family History   Problem Relation Age of Onset    Heart disease Mother     Hypertension Mother     Arthritis Mother     Osteoporosis Mother     Heart disease Father     Hypertension Father     Diabetes Father     Arthritis Father     Heart disease Sister     Diabetes Sister     Urolithiasis Brother        Family history, surgical history, past medical history, Allergies and meds reviewed with patient today and updated in [facility].     ROS:  Review of Systems   Constitutional:  Negative for fatigue.   Respiratory:  Negative for cough, chest tightness, shortness of breath and wheezing.    Cardiovascular:  Negative for chest pain and palpitations.   Genitourinary:  Positive for decreased libido. Negative for breast discharge and breast lump.   Neurological:  Positive for headache.     OBJECTIVE:  Vitals:    23 1541 23 1632   BP: 159/94 146/90   BP Location: Left arm Right arm   Patient Position: Sitting Sitting   Cuff Size:  Adult   Pulse: 84    Temp: 97.9 øF (36.6 øC)    SpO2: 96%    Weight:  "73.4 kg (161 lb 12.8 oz)    Height: 165.1 cm (65\")      No results found.   Body mass index is 26.92 kg/mý.  No LMP recorded (lmp unknown). Patient is postmenopausal.    Physical Exam  Vitals and nursing note reviewed. Exam conducted with a chaperone present.   Constitutional:       General: She is not in acute distress.     Appearance: Normal appearance. She is normal weight.   HENT:      Head: Normocephalic.   Cardiovascular:      Rate and Rhythm: Normal rate and regular rhythm.      Heart sounds: Normal heart sounds. No murmur heard.  Pulmonary:      Effort: Pulmonary effort is normal.      Breath sounds: Normal breath sounds. No wheezing.   Genitourinary:     Exam position: Lithotomy position.      Pubic Area: No rash.       Labia:         Right: No rash or lesion.         Left: No rash or lesion.       Vagina: Normal.      Cervix: Normal.      Uterus: Normal.       Adnexa: Right adnexa normal and left adnexa normal.        Right: No mass.          Left: No mass.     Neurological:      Mental Status: She is alert.       Procedures    No visits with results within 30 Day(s) from this visit.   Latest known visit with results is:   Admission on 03/16/2023, Discharged on 03/16/2023   Component Date Value Ref Range Status    SARS Antigen 03/16/2023 Not Detected  Not Detected, Presumptive Negative Final    Internal Control 03/16/2023 Passed  Passed Final    Lot Number 03/16/2023 [id number]   Final    Expiration Date 03/16/2023 10/2/2023   Final    Rapid Influenza A Ag 03/16/2023 Negative  Negative Final    Rapid Influenza B Ag 03/16/2023 Negative  Negative Final    Internal Control 03/16/2023 Passed  Passed Final    Lot Number 03/16/2023 [id number]   Final    Expiration Date 03/16/2023 11/8/2024   Final    Rapid Strep A Screen 03/16/2023 Negative   Final    Internal Control 03/16/2023 Passed   Final    Lot Number 03/16/2023 #[id number]   Final    Expiration Date 03/16/2023 9/7/2024   Final       ASSESSMENT/ " PLAN:    Diagnoses and all orders for this visit:    1. Encounter for annual routine gynecological examination (Primary)  Assessment & Plan:  Her exam today is normal.  We obtained a routine ThinPrep Pap smear.  Encouraged her that she needs to do her routine gynecological exam every 2 to 3 years but still annual mammograms.    Orders:  -     Mammo Screening Digital Tomosynthesis Bilateral With CAD; Future  -     Comprehensive Metabolic Panel  -     CBC & Differential  -     TSH  -     Lipid Panel    2. Screening breast examination  -     Mammo Screening Digital Tomosynthesis Bilateral With CAD; Future    3. Primary hypertension  Assessment & Plan:  Her blood pressure continues to be mildly elevated.  We will start a low-dose lisinopril.      Other orders  -     lisinopril (PRINIVIL,ZESTRIL) 5 MG tablet; Take 1 tablet by mouth Daily.  Dispense: 90 tablet; Refill: 1        Orders Placed Today:     New Medications Ordered This Visit   Medications    lisinopril (PRINIVIL,ZESTRIL) 5 MG tablet     Sig: Take 1 tablet by mouth Daily.     Dispense:  90 tablet     Refill:  1        Management Plan:     An After Visit Summary was printed and given to the patient at discharge.    Follow-up: Return in about 6 months (around 2/24/2024) for Recheck.    Avery Espinosa,  8/24/2023 16:41 EDT  This note was electronically signed.Answers submitted by the patient for this visit:  Primary Reason for Visit (Submitted on 8/23/2023)  What is the primary reason for your visit?: Physical

## 2023-08-25 ENCOUNTER — LAB (OUTPATIENT)
Dept: LAB | Facility: HOSPITAL | Age: 58
End: 2023-08-25
Payer: COMMERCIAL

## 2023-08-25 LAB
ALBUMIN SERPL-MCNC: 4.2 G/DL (ref 3.5–5.2)
ALBUMIN/GLOB SERPL: 1.4 G/DL
ALP SERPL-CCNC: 110 U/L (ref 39–117)
ALT SERPL W P-5'-P-CCNC: 17 U/L (ref 1–33)
ANION GAP SERPL CALCULATED.3IONS-SCNC: 10.3 MMOL/L (ref 5–15)
AST SERPL-CCNC: 23 U/L (ref 1–32)
BASOPHILS # BLD AUTO: 0.12 10*3/MM3 (ref 0–0.2)
BASOPHILS NFR BLD AUTO: 2.3 % (ref 0–1.5)
BILIRUB SERPL-MCNC: 0.4 MG/DL (ref 0–1.2)
BUN SERPL-MCNC: 10 MG/DL (ref 6–20)
BUN/CREAT SERPL: 13.5 (ref 7–25)
CALCIUM SPEC-SCNC: 8.9 MG/DL (ref 8.6–10.5)
CHLORIDE SERPL-SCNC: 108 MMOL/L (ref 98–107)
CHOLEST SERPL-MCNC: 158 MG/DL (ref 0–200)
CO2 SERPL-SCNC: 22.7 MMOL/L (ref 22–29)
CREAT SERPL-MCNC: 0.74 MG/DL (ref 0.57–1)
DEPRECATED RDW RBC AUTO: 41.7 FL (ref 37–54)
EGFRCR SERPLBLD CKD-EPI 2021: 94.5 ML/MIN/1.73
EOSINOPHIL # BLD AUTO: 0.69 10*3/MM3 (ref 0–0.4)
EOSINOPHIL NFR BLD AUTO: 13.2 % (ref 0.3–6.2)
ERYTHROCYTE [DISTWIDTH] IN BLOOD BY AUTOMATED COUNT: 12.9 % (ref 12.3–15.4)
GLOBULIN UR ELPH-MCNC: 3 GM/DL
GLUCOSE SERPL-MCNC: 103 MG/DL (ref 65–99)
HCT VFR BLD AUTO: 36.2 % (ref 34–46.6)
HDLC SERPL-MCNC: 28 MG/DL (ref 40–60)
HGB BLD-MCNC: 12.2 G/DL (ref 12–15.9)
IMM GRANULOCYTES # BLD AUTO: 0.01 10*3/MM3 (ref 0–0.05)
IMM GRANULOCYTES NFR BLD AUTO: 0.2 % (ref 0–0.5)
LDLC SERPL CALC-MCNC: 113 MG/DL (ref 0–100)
LDLC/HDLC SERPL: 4.01 {RATIO}
LYMPHOCYTES # BLD AUTO: 1.76 10*3/MM3 (ref 0.7–3.1)
LYMPHOCYTES NFR BLD AUTO: 33.6 % (ref 19.6–45.3)
MCH RBC QN AUTO: 30.2 PG (ref 26.6–33)
MCHC RBC AUTO-ENTMCNC: 33.7 G/DL (ref 31.5–35.7)
MCV RBC AUTO: 89.6 FL (ref 79–97)
MONOCYTES # BLD AUTO: 0.74 10*3/MM3 (ref 0.1–0.9)
MONOCYTES NFR BLD AUTO: 14.1 % (ref 5–12)
NEUTROPHILS NFR BLD AUTO: 1.92 10*3/MM3 (ref 1.7–7)
NEUTROPHILS NFR BLD AUTO: 36.6 % (ref 42.7–76)
NRBC BLD AUTO-RTO: 0 /100 WBC (ref 0–0.2)
PLATELET # BLD AUTO: 561 10*3/MM3 (ref 140–450)
PMV BLD AUTO: 10.1 FL (ref 6–12)
POTASSIUM SERPL-SCNC: 3.9 MMOL/L (ref 3.5–5.2)
PROT SERPL-MCNC: 7.2 G/DL (ref 6–8.5)
RBC # BLD AUTO: 4.04 10*6/MM3 (ref 3.77–5.28)
SODIUM SERPL-SCNC: 141 MMOL/L (ref 136–145)
TRIGL SERPL-MCNC: 89 MG/DL (ref 0–150)
TSH SERPL DL<=0.05 MIU/L-ACNC: 2.52 UIU/ML (ref 0.27–4.2)
VLDLC SERPL-MCNC: 17 MG/DL (ref 5–40)
WBC NRBC COR # BLD: 5.24 10*3/MM3 (ref 3.4–10.8)

## 2023-08-25 PROCEDURE — 80050 GENERAL HEALTH PANEL: CPT | Performed by: FAMILY MEDICINE

## 2023-08-25 PROCEDURE — 80061 LIPID PANEL: CPT | Performed by: FAMILY MEDICINE

## 2023-08-28 DIAGNOSIS — M25.50 ARTHRALGIA, UNSPECIFIED JOINT: ICD-10-CM

## 2023-08-28 DIAGNOSIS — F41.9 ANXIETY: ICD-10-CM

## 2023-08-28 DIAGNOSIS — M62.838 MUSCLE SPASMS OF NECK: ICD-10-CM

## 2023-08-28 RX ORDER — CITALOPRAM 40 MG/1
40 TABLET ORAL DAILY
Qty: 90 TABLET | Refills: 3 | Status: SHIPPED | OUTPATIENT
Start: 2023-08-28

## 2023-08-28 RX ORDER — METAXALONE 800 MG/1
800 TABLET ORAL 3 TIMES DAILY PRN
Qty: 90 TABLET | Refills: 1 | Status: SHIPPED | OUTPATIENT
Start: 2023-08-28

## 2023-08-28 RX ORDER — CELECOXIB 100 MG/1
100 CAPSULE ORAL DAILY
Qty: 90 CAPSULE | Refills: 3 | Status: SHIPPED | OUTPATIENT
Start: 2023-08-28

## 2023-09-14 DIAGNOSIS — E78.6 LOW HDL (UNDER 40): ICD-10-CM

## 2023-09-14 RX ORDER — ATORVASTATIN CALCIUM 10 MG/1
10 TABLET, FILM COATED ORAL NIGHTLY
Qty: 90 TABLET | Refills: 3 | Status: SHIPPED | OUTPATIENT
Start: 2023-09-14

## 2023-10-31 ENCOUNTER — OFFICE VISIT (OUTPATIENT)
Dept: ORTHOPEDIC SURGERY | Facility: CLINIC | Age: 58
End: 2023-10-31
Payer: COMMERCIAL

## 2023-10-31 VITALS — BODY MASS INDEX: 26.82 KG/M2 | HEIGHT: 65 IN | OXYGEN SATURATION: 96 % | HEART RATE: 66 BPM | WEIGHT: 161 LBS

## 2023-10-31 DIAGNOSIS — M25.512 LEFT SHOULDER PAIN, UNSPECIFIED CHRONICITY: ICD-10-CM

## 2023-10-31 DIAGNOSIS — M75.101 TEAR OF RIGHT ROTATOR CUFF, UNSPECIFIED TEAR EXTENT, UNSPECIFIED WHETHER TRAUMATIC: ICD-10-CM

## 2023-10-31 DIAGNOSIS — M25.511 RIGHT SHOULDER PAIN, UNSPECIFIED CHRONICITY: Primary | ICD-10-CM

## 2023-10-31 DIAGNOSIS — M77.8 LEFT SHOULDER TENDONITIS: ICD-10-CM

## 2023-10-31 PROCEDURE — 99213 OFFICE O/P EST LOW 20 MIN: CPT | Performed by: ORTHOPAEDIC SURGERY

## 2023-10-31 PROCEDURE — 20610 DRAIN/INJ JOINT/BURSA W/O US: CPT | Performed by: ORTHOPAEDIC SURGERY

## 2023-10-31 RX ADMIN — LIDOCAINE HYDROCHLORIDE 5 ML: 10 INJECTION, SOLUTION INFILTRATION; PERINEURAL at 14:05

## 2023-10-31 RX ADMIN — TRIAMCINOLONE ACETONIDE 40 MG: 40 INJECTION, SUSPENSION INTRA-ARTICULAR; INTRAMUSCULAR at 14:05

## 2023-10-31 NOTE — PROGRESS NOTES
"Chief Complaint  Follow-up and Pain of the Left Shoulder and Follow-up, Pain, and Initial Evaluation of the Right Shoulder     Subjective      Romelia Young presents to [facility] for follow up evaluation of the left shoulder and evaluation of the right shoulder. The patient has been treating her left shoulder tendonitis conservatively. Her last injection was over 1 year ago. She reports her right is worse than the left. She has a history of a rotator cuff on her right shoulder. She takes Celebrex.     No Known Allergies     Social History     Socioeconomic History    Marital status:    Tobacco Use    Smoking status: Former     Packs/day: 0.25     Years: 12.00     Additional pack years: 0.00     Total pack years: 3.00     Types: Cigarettes     Start date: 1983     Quit date: 1995     Years since quittin.8    Smokeless tobacco: Never   Vaping Use    Vaping Use: Never used   Substance and Sexual Activity    Alcohol use: Never    Drug use: Never    Sexual activity: Defer        I reviewed the patient's chief complaint, history of present illness, review of systems, past medical history, surgical history, family history, social history, medications, and allergy list.     Review of Systems     Constitutional: Denies fevers, chills, weight loss  Cardiovascular: Denies chest pain, shortness of breath  Skin: Denies rashes, acute skin changes  Neurologic: Denies headache, loss of consciousness  MSK: bilateral shoulder pain      Vital Signs:   Pulse 66   Ht 165.1 cm (65\")   Wt 73 kg (161 lb)   SpO2 96%   BMI 26.79 kg/m²          Physical Exam  General: Alert. No acute distress    Ortho Exam      Left shoulder- Forward elevation 175 degrees. Abduction 135. External Rotation 80. Internal rotation 60. 4+ supraspinatus strength, 5/5 infraspinatus and subscapularis. Internal rotation T-10. Negative lift off. Positive impingement signs. Sensation to light touch median, " radial, ulnar nerve. Positive AIN, PIN, ulnar nerve motor function. Positive pulses.     Right shoulder- Forward elevation 170, Abduction 150. External Rotation 70. Internal rotation 60. 4+ supraspinatus strength, 5/5 infraspinatus and subscapularis. Internal rotation T-10. Pain with lift off. Positive impingement signs. Sensation to light touch median, radial, ulnar nerve. Positive AIN, PIN, ulnar nerve motor function. Positive pulses.       Large Joint Arthrocentesis  Date/Time: 10/31/2023 2:05 PM  Consent given by: patient  Site marked: site marked  Timeout: Immediately prior to procedure a time out was called to verify the correct patient, procedure, equipment, support staff and site/side marked as required   Supporting Documentation  Indications: pain   Procedure Details  Location: shoulder (right) -   Needle gauge: 21g.  Medications administered: 5 mL lidocaine 1 %; 40 mg triamcinolone acetonide 40 MG/ML  Patient tolerance: patient tolerated the procedure well with no immediate complications      Large Joint Arthrocentesis  Date/Time: 10/31/2023 2:05 PM  Consent given by: patient  Site marked: site marked  Timeout: Immediately prior to procedure a time out was called to verify the correct patient, procedure, equipment, support staff and site/side marked as required   Supporting Documentation  Indications: pain   Procedure Details  Location: shoulder (left) -   Needle gauge: 21g.  Medications administered: 5 mL lidocaine 1 %; 40 mg triamcinolone acetonide 40 MG/ML  Patient tolerance: patient tolerated the procedure well with no immediate complications      X-Ray Report:  Left scapula(s) X-Ray  Indication: Evaluation of left shoulder pain  AP and Lateral view(s)  Findings: intact hardware to the clavicle. No acute fracture to proximal humerus. Mild degenerative changes to the acromioclavicular joint and glenohumeral joint. scoliosis to the thoracolumber spine.   Prior studies available for comparison: no      X-Ray Report:  Right scapula X-Ray  Indication: Evaluation of right shoulder pain  AP/Lateral view(s)  Findings: Mild degenerative changes to the acromioclavicular joint and glenohumeral joint.  Prior studies available for comparison: no       Imaging Results (Most Recent)       Procedure Component Value Units Date/Time    XR Scapula Right [[id number]] Resulted: 10/31/23 1343     Updated: 10/31/23 1344    XR Scapula Left [[id number]] Resulted: 10/31/23 1343     Updated: 10/31/23 1344             Result Review :       No results found.           Assessment and Plan     Diagnoses and all orders for this visit:    1. Right shoulder pain, unspecified chronicity (Primary)  -     XR Scapula Right    2. Left shoulder pain, unspecified chronicity  -     XR Scapula Left    3. Left shoulder tendonitis    4. Tear of right rotator cuff, unspecified tear extent, unspecified whether traumatic        Discussed the treatment plan with the patient.  I reviewed the x-rays that were obtained today with the patient. Discussed the risks and benefits of bilateral shoulder steroid injections. The patient expressed understanding and wished to proceed. She tolerated the injections well.     Call or return if worsening symptoms.    Follow Up     PRN      Patient was given instructions and counseling regarding her condition or for health maintenance advice. Please see specific information pulled into the AVS if appropriate.     Scribed for Ephraim Lamb MD by Marcia Christensen.  10/31/23   13:50 EDT    I have personally performed the services described in this document as scribed by the above individual and it is both accurate and complete. Ephraim Lamb MD 11/01/23

## 2023-11-01 RX ORDER — TRIAMCINOLONE ACETONIDE 40 MG/ML
40 INJECTION, SUSPENSION INTRA-ARTICULAR; INTRAMUSCULAR
Status: COMPLETED | OUTPATIENT
Start: 2023-10-31 | End: 2023-10-31

## 2023-11-01 RX ORDER — LIDOCAINE HYDROCHLORIDE 10 MG/ML
5 INJECTION, SOLUTION INFILTRATION; PERINEURAL
Status: COMPLETED | OUTPATIENT
Start: 2023-10-31 | End: 2023-10-31

## 2023-12-11 ENCOUNTER — TELEPHONE (OUTPATIENT)
Dept: FAMILY MEDICINE CLINIC | Facility: CLINIC | Age: 58
End: 2023-12-11

## 2023-12-11 NOTE — TELEPHONE ENCOUNTER
"Caller: Romelia Young \"DEDE\"    Relationship: Self    Best call back number: [phone] PLEASE LEAVE DETAILED MESSAGE     What medication are you requesting: OINTMENT    What are your current symptoms: RASH FROM MASK    Have you had these symptoms before:    [x] Yes  [] No    Have you been treated for these symptoms before:   [x] Yes  [] No    Additional notes:STATED SHE HAS BEEN PRESCRIBED THIS BEFORE AND LIKED IT.   "

## 2024-02-09 RX ORDER — LISINOPRIL 5 MG/1
5 TABLET ORAL DAILY
Qty: 90 TABLET | Refills: 1 | Status: SHIPPED | OUTPATIENT
Start: 2024-02-09

## 2024-02-15 ENCOUNTER — HOSPITAL ENCOUNTER (OUTPATIENT)
Dept: MAMMOGRAPHY | Facility: HOSPITAL | Age: 59
Discharge: HOME | End: 2024-02-15
Admitting: FAMILY MEDICINE
Payer: COMMERCIAL

## 2024-02-15 DIAGNOSIS — Z01.419 ENCOUNTER FOR ANNUAL ROUTINE GYNECOLOGICAL EXAMINATION: ICD-10-CM

## 2024-02-15 DIAGNOSIS — Z12.39 SCREENING BREAST EXAMINATION: ICD-10-CM

## 2024-02-15 PROCEDURE — 77063 BREAST TOMOSYNTHESIS BI: CPT

## 2024-02-15 PROCEDURE — 77067 SCR MAMMO BI INCL CAD: CPT

## 2024-02-27 NOTE — TELEPHONE ENCOUNTER
Caller: Romelia Young    Relationship: Self    Best call back number: [phone]    What was the call regarding: PT REQUESTING HER PCP BE CHANGED TO DR ZEPEDA    Do you require a callback: PLEASE CALL BACK AND ADVISE           What Type Of Note Output Would You Prefer (Optional)?: Standard Output Is This A New Presentation, Or A Follow-Up?: Skin Lesion

## 2024-03-07 ENCOUNTER — OFFICE VISIT (OUTPATIENT)
Dept: FAMILY MEDICINE CLINIC | Facility: CLINIC | Age: 59
End: 2024-03-07
Payer: COMMERCIAL

## 2024-03-07 VITALS
WEIGHT: 157 LBS | TEMPERATURE: 97.1 F | HEIGHT: 65 IN | HEART RATE: 72 BPM | BODY MASS INDEX: 26.16 KG/M2 | DIASTOLIC BLOOD PRESSURE: 82 MMHG | OXYGEN SATURATION: 96 % | SYSTOLIC BLOOD PRESSURE: 139 MMHG

## 2024-03-07 DIAGNOSIS — J40 SINOBRONCHITIS: ICD-10-CM

## 2024-03-07 DIAGNOSIS — R00.2 PALPITATIONS: ICD-10-CM

## 2024-03-07 DIAGNOSIS — J32.9 SINOBRONCHITIS: ICD-10-CM

## 2024-03-07 DIAGNOSIS — I10 PRIMARY HYPERTENSION: Primary | ICD-10-CM

## 2024-03-07 DIAGNOSIS — F41.9 ANXIETY: ICD-10-CM

## 2024-03-07 PROCEDURE — 84439 ASSAY OF FREE THYROXINE: CPT | Performed by: FAMILY MEDICINE

## 2024-03-07 PROCEDURE — 99214 OFFICE O/P EST MOD 30 MIN: CPT | Performed by: FAMILY MEDICINE

## 2024-03-07 PROCEDURE — 80061 LIPID PANEL: CPT | Performed by: FAMILY MEDICINE

## 2024-03-07 PROCEDURE — 80050 GENERAL HEALTH PANEL: CPT | Performed by: FAMILY MEDICINE

## 2024-03-07 RX ORDER — CEFDINIR 300 MG/1
300 CAPSULE ORAL 2 TIMES DAILY
Qty: 20 CAPSULE | Refills: 0 | Status: SHIPPED | OUTPATIENT
Start: 2024-03-07 | End: 2024-03-17

## 2024-03-07 RX ORDER — METOPROLOL SUCCINATE 25 MG/1
25 TABLET, EXTENDED RELEASE ORAL DAILY
Qty: 90 TABLET | Refills: 1 | Status: SHIPPED | OUTPATIENT
Start: 2024-03-07

## 2024-03-07 RX ORDER — METHYLPREDNISOLONE ACETATE 80 MG/ML
80 INJECTION, SUSPENSION INTRA-ARTICULAR; INTRALESIONAL; INTRAMUSCULAR; SOFT TISSUE ONCE
Status: COMPLETED | OUTPATIENT
Start: 2024-03-07 | End: 2024-03-07

## 2024-03-07 RX ADMIN — METHYLPREDNISOLONE ACETATE 80 MG: 80 INJECTION, SUSPENSION INTRA-ARTICULAR; INTRALESIONAL; INTRAMUSCULAR; SOFT TISSUE at 14:38

## 2024-03-07 NOTE — ASSESSMENT & PLAN NOTE
Will change her lisinopril to low-dose metoprolol succinate.  Hopefully this will control her blood pressure as well as her palpitations.

## 2024-03-07 NOTE — PROGRESS NOTES
Chief Complaint   Patient presents with    Follow-up     6 month     Hypertension     Having side effects to new BP medication   C/O swollen eyelids and blurry vision , cough . Pt refused testing         Subjective     Romelia Young  has a past medical history of Allergic, Anemia, unspecified, Ankle sprain (06/21/2018), Anxiety, Arthritis, Bursitis of hip, right (04/15/2018), Bursitis of right shoulder (09/25/2018), Callus, Chronic allergic rhinitis, Concussion (08/2012), GERD (gastroesophageal reflux disease), Hemorrhoids, Hernia, hiatal, History of transfusion, Infectious viral hepatitis (C antibodies), Ingrown toenail (Past), Left ankle pain (06/26/2018), Menopause, Pain of right hip joint (04/25/2018), Plantar fasciitis (Past), Right shoulder pain (04/25/2018), Seasonal allergies, Sensitive skin, Shoulder impingement, right (09/25/2018), Shoulder tendinitis (07/09/2015), Shoulder tendinitis, right (04/25/2018), and Trochanteric bursitis, right hip (06/26/2018).    Cough-she has had ongoing symptoms for about the last 5 days.  She has lots of nasal congestion nasal drainage postnasal drip and and ear pain.  She also has a cough with productive sputum.  She is wheezing but not short of breath.  She has taken some over-the-counter counter medication without much improvement.    Hypertension  This is a chronic problem. The current episode started more than 1 year ago. The problem is unchanged. The problem is controlled. Associated symptoms include blurred vision and palpitations. Pertinent negatives include no chest pain, headaches or shortness of breath. Risk factors for coronary artery disease include dyslipidemia and post-menopausal state. Current antihypertension treatment includes ACE inhibitors. The current treatment provides significant improvement. There are no compliance problems.        PHQ-2 Depression Screening  Little interest or pleasure in doing things?     Feeling down, depressed, or hopeless?      PHQ-2 Total Score     PHQ-9 Depression Screening  Little interest or pleasure in doing things?     Feeling down, depressed, or hopeless?     Trouble falling or staying asleep, or sleeping too much?     Feeling tired or having little energy?     Poor appetite or overeating?     Feeling bad about yourself - or that you are a failure or have let yourself or your family down?     Trouble concentrating on things, such as reading the newspaper or watching television?     Moving or speaking so slowly that other people could have noticed? Or the opposite - being so fidgety or restless that you have been moving around a lot more than usual?     Thoughts that you would be better off dead, or of hurting yourself in some way?     PHQ-9 Total Score     If you checked off any problems, how difficult have these problems made it for you to do your work, take care of things at home, or get along with other people?       No Known Allergies    Prior to Admission medications    Medication Sig Start Date End Date Taking? Authorizing Provider   acyclovir (ZOVIRAX) 5 % ointment Use as needed for cold sores. 8/2/23  Yes    atorvastatin (Lipitor) 10 MG tablet Take 1 tablet by mouth Every Night. 9/14/23  Yes Avery Espinosa, DO   celecoxib (CeleBREX) 100 MG capsule Take 1 capsule by mouth Daily. 8/28/23  Yes Avery Espinosa DO   cetirizine (zyrTEC) 10 MG tablet Zyrtec oral tablet 10 mg take 1 tablet (10 mg) by oral route once daily   Active   Yes Erwin Mason MD   citalopram (CeleXA) 40 MG tablet Take 1 tablet by mouth Daily. 8/28/23  Yes Avery Espinosa, DO   Docusate Calcium (STOOL SOFTENER PO) Take  by mouth As Needed.   Yes Erwin Mason MD   famotidine (PEPCID) 20 MG tablet Take 1 tablet by mouth.   Yes Erwin Mason MD   lisinopril (PRINIVIL,ZESTRIL) 5 MG tablet Take 1 tablet by mouth Daily. 2/9/24  Yes Avery Espinosa, DO   promethazine (PHENERGAN) 12.5 MG tablet Take 1  tablet by mouth every 6 hours as needed 3/20/23  Yes    metaxalone (Skelaxin) 800 MG tablet Take 1 tablet by mouth 3 (Three) Times a Day As Needed for Muscle Spasms.  Patient not taking: Reported on 3/7/2024 8/28/23   Avery Espinosa DO        Patient Active Problem List   Diagnosis    Trochanteric bursitis    Bilateral hip pain    GERD (gastroesophageal reflux disease)    Anemia    Bursitis    Disorder of bursae and tendons in shoulder region    Dupuytren contracture    Left ankle pain    Right shoulder pain    Chronic allergic rhinitis    Shoulder impingement, right    Sprain of ankle    Trochanteric bursitis of right hip    Pain of right hip joint    Bursitis of shoulder    Anxiety    Low HDL (under 40)    Anemia, unspecified    Encounter for annual routine gynecological examination    Screening breast examination    Hypertension    Palpitations    Sinobronchitis        Past Surgical History:   Procedure Laterality Date     SECTION      CLAVICLE SURGERY      clavicle fracture surgery - [facility]    COLONOSCOPY  2017    ENDOSCOPY      OTHER SURGICAL HISTORY      Metal Implant    OTHER SURGICAL HISTORY      Tuboplasty    TOENAIL EXCISION         Social History     Socioeconomic History    Marital status:    Tobacco Use    Smoking status: Former     Current packs/day: 0.00     Average packs/day: 0.3 packs/day for 12.0 years (3.0 ttl pk-yrs)     Types: Cigarettes     Start date: 1983     Quit date: 1995     Years since quittin.2    Smokeless tobacco: Never   Vaping Use    Vaping status: Never Used   Substance and Sexual Activity    Alcohol use: Never    Drug use: Never    Sexual activity: Defer       Family History   Problem Relation Age of Onset    Heart disease Mother     Hypertension Mother     Arthritis Mother     Osteoporosis Mother     Heart disease Father     Hypertension Father     Diabetes Father     Arthritis Father     Heart disease Sister     Diabetes Sister   "   Urolithiasis Brother        Family history, surgical history, past medical history, Allergies and meds reviewed with patient today and updated in [facility] EMR.     ROS:  Review of Systems   Constitutional:  Negative for chills and fever.   HENT:  Positive for congestion, postnasal drip and sinus pressure.    Eyes:  Positive for blurred vision.   Respiratory:  Positive for cough and wheezing. Negative for chest tightness and shortness of breath.    Cardiovascular:  Positive for palpitations. Negative for chest pain.   Neurological:  Positive for headache.       OBJECTIVE:  Vitals:    03/07/24 1349   BP: 139/82   BP Location: Left arm   Patient Position: Sitting   Pulse: 72   Temp: 97.1 °F (36.2 °C)   SpO2: 96%   Weight: 71.2 kg (157 lb)   Height: 165.1 cm (65\")     No results found.   Body mass index is 26.13 kg/m².  No LMP recorded (lmp unknown). Patient is postmenopausal.    The 10-year ASCVD risk score (Tricia CHURCH, et al., 2019) is: 5.8%    Values used to calculate the score:      Age: 58 years      Sex: Female      Is Non- : No      Diabetic: No      Tobacco smoker: No      Systolic Blood Pressure: 139 mmHg      Is BP treated: Yes      HDL Cholesterol: 28 mg/dL      Total Cholesterol: 158 mg/dL     Physical Exam  Vitals and nursing note reviewed.   Constitutional:       General: She is not in acute distress.     Appearance: Normal appearance. She is normal weight.   HENT:      Head: Normocephalic.      Right Ear: Tympanic membrane, ear canal and external ear normal.      Left Ear: Tympanic membrane, ear canal and external ear normal.      Nose: Nose normal.      Mouth/Throat:      Mouth: Mucous membranes are moist.      Pharynx: Oropharynx is clear.   Eyes:      General: No scleral icterus.     Conjunctiva/sclera: Conjunctivae normal.      Pupils: Pupils are equal, round, and reactive to light.   Cardiovascular:      Rate and Rhythm: Normal rate and regular rhythm.      Pulses: Normal " pulses.      Heart sounds: Normal heart sounds. No murmur heard.  Pulmonary:      Effort: Pulmonary effort is normal.      Breath sounds: Normal breath sounds. No wheezing, rhonchi or rales.   Musculoskeletal:      Cervical back: Neck supple. No rigidity or tenderness.   Lymphadenopathy:      Cervical: No cervical adenopathy.   Skin:     General: Skin is warm and dry.      Coloration: Skin is not jaundiced.      Findings: No rash.   Neurological:      General: No focal deficit present.      Mental Status: She is alert and oriented to person, place, and time.   Psychiatric:         Mood and Affect: Mood normal.         Thought Content: Thought content normal.         Judgment: Judgment normal.         Procedures    No visits with results within 30 Day(s) from this visit.   Latest known visit with results is:   Office Visit on 08/24/2023   Component Date Value Ref Range Status    Glucose 08/25/2023 103 (H)  65 - 99 mg/dL Final    BUN 08/25/2023 10  6 - 20 mg/dL Final    Creatinine 08/25/2023 0.74  0.57 - 1.00 mg/dL Final    Sodium 08/25/2023 141  136 - 145 mmol/L Final    Potassium 08/25/2023 3.9  3.5 - 5.2 mmol/L Final    Chloride 08/25/2023 108 (H)  98 - 107 mmol/L Final    CO2 08/25/2023 22.7  22.0 - 29.0 mmol/L Final    Calcium 08/25/2023 8.9  8.6 - 10.5 mg/dL Final    Total Protein 08/25/2023 7.2  6.0 - 8.5 g/dL Final    Albumin 08/25/2023 4.2  3.5 - 5.2 g/dL Final    ALT (SGPT) 08/25/2023 17  1 - 33 U/L Final    AST (SGOT) 08/25/2023 23  1 - 32 U/L Final    Alkaline Phosphatase 08/25/2023 110  39 - 117 U/L Final    Total Bilirubin 08/25/2023 0.4  0.0 - 1.2 mg/dL Final    Globulin 08/25/2023 3.0  gm/dL Final    A/G Ratio 08/25/2023 1.4  g/dL Final    BUN/Creatinine Ratio 08/25/2023 13.5  7.0 - 25.0 Final    Anion Gap 08/25/2023 10.3  5.0 - 15.0 mmol/L Final    eGFR 08/25/2023 94.5  >60.0 mL/min/1.73 Final    TSH 08/25/2023 2.520  0.270 - 4.200 uIU/mL Final    Total Cholesterol 08/25/2023 158  0 - 200 mg/dL Final     Triglycerides 08/25/2023 89  0 - 150 mg/dL Final    HDL Cholesterol 08/25/2023 28 (L)  40 - 60 mg/dL Final    LDL Cholesterol  08/25/2023 113 (H)  0 - 100 mg/dL Final    VLDL Cholesterol 08/25/2023 17  5 - 40 mg/dL Final    LDL/HDL Ratio 08/25/2023 4.01   Final    Diagnosis 08/24/2023 Comment   Final    NEGATIVE FOR INTRAEPITHELIAL LESION OR MALIGNANCY.  CELLULAR CHANGES ASSOCIATED WITH ATROPHY ARE PRESENT.    Specimen adequacy: 08/24/2023 Comment   Final    Satisfactory for evaluation.  Endocervical component may not be  distinguished in cases of atrophy.    Clinician Provided ICD-10: 08/24/2023 Comment   Final    Z01.419    Performed by: 08/24/2023 Comment   Final    Leni Judd, Cytotechnologist (ASCP)    . 08/24/2023 .   Final    Note: 08/24/2023 Comment   Final    The Pap smear is a screening test designed to aid in the detection of  premalignant and malignant conditions of the uterine cervix.  It is not a  diagnostic procedure and should not be used as the sole means of detecting  cervical cancer.  Both false-positive and false-negative reports do occur.    Method: 08/24/2023 Comment   Final    This liquid based ThinPrep(R) pap test was screened with the  use of an image guided system.    Conv .conv 08/24/2023 Comment   Final    The HPV DNA reflex criteria were not met with this specimen result  therefore, no HPV testing was performed.    WBC 08/25/2023 5.24  3.40 - 10.80 10*3/mm3 Final    RBC 08/25/2023 4.04  3.77 - 5.28 10*6/mm3 Final    Hemoglobin 08/25/2023 12.2  12.0 - 15.9 g/dL Final    Hematocrit 08/25/2023 36.2  34.0 - 46.6 % Final    MCV 08/25/2023 89.6  79.0 - 97.0 fL Final    MCH 08/25/2023 30.2  26.6 - 33.0 pg Final    MCHC 08/25/2023 33.7  31.5 - 35.7 g/dL Final    RDW 08/25/2023 12.9  12.3 - 15.4 % Final    RDW-SD 08/25/2023 41.7  37.0 - 54.0 fl Final    MPV 08/25/2023 10.1  6.0 - 12.0 fL Final    Platelets 08/25/2023 561 (H)  140 - 450 10*3/mm3 Final    Neutrophil % 08/25/2023  36.6 (L)  42.7 - 76.0 % Final    Lymphocyte % 08/25/2023 33.6  19.6 - 45.3 % Final    Monocyte % 08/25/2023 14.1 (H)  5.0 - 12.0 % Final    Eosinophil % 08/25/2023 13.2 (H)  0.3 - 6.2 % Final    Basophil % 08/25/2023 2.3 (H)  0.0 - 1.5 % Final    Immature Grans % 08/25/2023 0.2  0.0 - 0.5 % Final    Neutrophils, Absolute 08/25/2023 1.92  1.70 - 7.00 10*3/mm3 Final    Lymphocytes, Absolute 08/25/2023 1.76  0.70 - 3.10 10*3/mm3 Final    Monocytes, Absolute 08/25/2023 0.74  0.10 - 0.90 10*3/mm3 Final    Eosinophils, Absolute 08/25/2023 0.69 (H)  0.00 - 0.40 10*3/mm3 Final    Basophils, Absolute 08/25/2023 0.12  0.00 - 0.20 10*3/mm3 Final    Immature Grans, Absolute 08/25/2023 0.01  0.00 - 0.05 10*3/mm3 Final    nRBC 08/25/2023 0.0  0.0 - 0.2 /100 WBC Final       ASSESSMENT/ PLAN:    Diagnoses and all orders for this visit:    1. Primary hypertension (Primary)  Assessment & Plan:  Her blood pressure at home is good as well as here.  Will update her labs.    Orders:  -     Comprehensive Metabolic Panel  -     Lipid Panel  -     CBC (No Diff)    2. Palpitations  Assessment & Plan:  Will change her lisinopril to low-dose metoprolol succinate.  Hopefully this will control her blood pressure as well as her palpitations.    Orders:  -     TSH+Free T4    3. Sinobronchitis  Assessment & Plan:  She has a little sinusitis bronchitis.  Will add a course of antibiotics.      Other orders  -     metoprolol succinate XL (Toprol XL) 25 MG 24 hr tablet; Take 1 tablet by mouth Daily.  Dispense: 90 tablet; Refill: 1  -     cefdinir (OMNICEF) 300 MG capsule; Take 1 capsule by mouth 2 (Two) Times a Day for 10 days.  Dispense: 20 capsule; Refill: 0               Orders Placed Today:     New Medications Ordered This Visit   Medications    metoprolol succinate XL (Toprol XL) 25 MG 24 hr tablet     Sig: Take 1 tablet by mouth Daily.     Dispense:  90 tablet     Refill:  1    cefdinir (OMNICEF) 300 MG capsule     Sig: Take 1 capsule by mouth 2  (Two) Times a Day for 10 days.     Dispense:  20 capsule     Refill:  0        Management Plan:     An After Visit Summary was printed and given to the patient at discharge.    Follow-up: Return in about 3 months (around 6/7/2024) for Recheck.    Avery Espinosa, DO 3/7/2024 14:20 EST  This note was electronically signed.  Answers submitted by the patient for this visit:  Office Visit on 3/7/2024  2:00 PM with Avery Espinosa  Other (Submitted on 3/6/2024)  Please describe your symptoms.: Bp check. Started getting sick over last weekend. Have head and nose congestion with cough. Think it is most likely a sinus infection. I have a hx of sinus infections approximately 2 times a year. Also the zesteril 5 mg has causes eyelids to swell and rash to face. And the cough that comes with ot is worse. May need to switch to a different bp pill  Have you had these symptoms before?: Yes  How long have you been having these symptoms?: 5-7 days  Please list any medications you are currently taking for this condition.: Es tylenol flonase   Zyrtec  Please describe any probable cause for these symptoms. : My allergies to pollen and ragweed are worse this time of year

## 2024-03-08 ENCOUNTER — TELEPHONE (OUTPATIENT)
Dept: FAMILY MEDICINE CLINIC | Facility: CLINIC | Age: 59
End: 2024-03-08
Payer: COMMERCIAL

## 2024-03-08 LAB
ALBUMIN SERPL-MCNC: 4.1 G/DL (ref 3.5–5.2)
ALBUMIN/GLOB SERPL: 1.2 G/DL
ALP SERPL-CCNC: 94 U/L (ref 39–117)
ALT SERPL W P-5'-P-CCNC: 23 U/L (ref 1–33)
ANION GAP SERPL CALCULATED.3IONS-SCNC: 12.9 MMOL/L (ref 5–15)
AST SERPL-CCNC: 36 U/L (ref 1–32)
BILIRUB SERPL-MCNC: 0.2 MG/DL (ref 0–1.2)
BUN SERPL-MCNC: 7 MG/DL (ref 6–20)
BUN/CREAT SERPL: 9.3 (ref 7–25)
CALCIUM SPEC-SCNC: 8.8 MG/DL (ref 8.6–10.5)
CHLORIDE SERPL-SCNC: 104 MMOL/L (ref 98–107)
CHOLEST SERPL-MCNC: 114 MG/DL (ref 0–200)
CO2 SERPL-SCNC: 23.1 MMOL/L (ref 22–29)
CREAT SERPL-MCNC: 0.75 MG/DL (ref 0.57–1)
DEPRECATED RDW RBC AUTO: 43 FL (ref 37–54)
EGFRCR SERPLBLD CKD-EPI 2021: 92.4 ML/MIN/1.73
ERYTHROCYTE [DISTWIDTH] IN BLOOD BY AUTOMATED COUNT: 12.7 % (ref 12.3–15.4)
GLOBULIN UR ELPH-MCNC: 3.5 GM/DL
GLUCOSE SERPL-MCNC: 73 MG/DL (ref 65–99)
HCT VFR BLD AUTO: 39.8 % (ref 34–46.6)
HDLC SERPL-MCNC: 22 MG/DL (ref 40–60)
HGB BLD-MCNC: 13.2 G/DL (ref 12–15.9)
LDLC SERPL CALC-MCNC: 77 MG/DL (ref 0–100)
LDLC/HDLC SERPL: 3.52 {RATIO}
MCH RBC QN AUTO: 30.5 PG (ref 26.6–33)
MCHC RBC AUTO-ENTMCNC: 33.2 G/DL (ref 31.5–35.7)
MCV RBC AUTO: 91.9 FL (ref 79–97)
PLATELET # BLD AUTO: 465 10*3/MM3 (ref 140–450)
PMV BLD AUTO: 10.9 FL (ref 6–12)
POTASSIUM SERPL-SCNC: 3.4 MMOL/L (ref 3.5–5.2)
PROT SERPL-MCNC: 7.6 G/DL (ref 6–8.5)
RBC # BLD AUTO: 4.33 10*6/MM3 (ref 3.77–5.28)
SODIUM SERPL-SCNC: 140 MMOL/L (ref 136–145)
T4 FREE SERPL-MCNC: 1.19 NG/DL (ref 0.93–1.7)
TRIGL SERPL-MCNC: 73 MG/DL (ref 0–150)
TSH SERPL DL<=0.05 MIU/L-ACNC: 2.23 UIU/ML (ref 0.27–4.2)
VLDLC SERPL-MCNC: 15 MG/DL (ref 5–40)
WBC NRBC COR # BLD AUTO: 5.02 10*3/MM3 (ref 3.4–10.8)

## 2024-03-08 RX ORDER — CODEINE PHOSPHATE/GUAIFENESIN 10-100MG/5
5 LIQUID (ML) ORAL 3 TIMES DAILY PRN
Qty: 120 ML | Refills: 0 | Status: SHIPPED | OUTPATIENT
Start: 2024-03-08

## 2024-03-08 NOTE — TELEPHONE ENCOUNTER
Patient called stating she has been sick and forgot to ask at visit yesterday if you could send her in the cough syrup with codeine in it. Patient also stated you took her off of her celebrex, stated she can not be taken off of that. Stated she has been taking this for years for her arthritis. Patient also stated you put her on trintellix and she is not able to afford that and is asking for something else. Please advise.    Patient is asking that we do not call her due to her sleeping for night shift. Asked we send [employer] message with dr santiago response.

## 2024-05-10 DIAGNOSIS — F41.9 ANXIETY: ICD-10-CM

## 2024-05-10 DIAGNOSIS — M25.50 ARTHRALGIA, UNSPECIFIED JOINT: ICD-10-CM

## 2024-05-13 RX ORDER — CITALOPRAM 40 MG/1
40 TABLET ORAL DAILY
Qty: 90 TABLET | Refills: 3 | OUTPATIENT
Start: 2024-05-13

## 2024-05-13 RX ORDER — CELECOXIB 100 MG/1
100 CAPSULE ORAL DAILY
Qty: 90 CAPSULE | Refills: 3 | OUTPATIENT
Start: 2024-05-13

## 2024-05-21 ENCOUNTER — TELEPHONE (OUTPATIENT)
Dept: FAMILY MEDICINE CLINIC | Facility: CLINIC | Age: 59
End: 2024-05-21
Payer: COMMERCIAL

## 2024-05-21 DIAGNOSIS — F41.9 ANXIETY: ICD-10-CM

## 2024-05-21 DIAGNOSIS — M25.50 ARTHRALGIA, UNSPECIFIED JOINT: ICD-10-CM

## 2024-05-21 RX ORDER — CITALOPRAM 40 MG/1
40 TABLET ORAL DAILY
Qty: 90 TABLET | Refills: 3 | Status: SHIPPED | OUTPATIENT
Start: 2024-05-21

## 2024-05-21 RX ORDER — METOPROLOL SUCCINATE 25 MG/1
25 TABLET, EXTENDED RELEASE ORAL DAILY
Qty: 90 TABLET | Refills: 1 | Status: CANCELLED | OUTPATIENT
Start: 2024-05-21

## 2024-05-21 RX ORDER — METOPROLOL SUCCINATE 25 MG/1
25 TABLET, EXTENDED RELEASE ORAL DAILY
Qty: 90 TABLET | Refills: 3 | Status: SHIPPED | OUTPATIENT
Start: 2024-05-21

## 2024-05-21 RX ORDER — CELECOXIB 100 MG/1
100 CAPSULE ORAL DAILY
Qty: 90 CAPSULE | Refills: 3 | Status: SHIPPED | OUTPATIENT
Start: 2024-05-21

## 2024-05-21 NOTE — TELEPHONE ENCOUNTER
Patient stated she can not afford the trintellix and never started it. She stated she has been taking celecoxib for over 20 years and can not stop taking it. She stated she was never informed that these medications were going to be d/c and needs them refilled please

## 2024-05-21 NOTE — TELEPHONE ENCOUNTER
"    Caller: Romelia Young \"DEDE\"    Relationship: Self    Best call back number: [phone]    Requested Prescriptions:     Citalopram Hydrobromide 40 mg Oral Daily    Celecoxib 100 mg Oral Daily     metoprolol succinate XL (Toprol XL) 25 MG 24 hr tablet        Pharmacy where request should be sent:  [facility] [phone]     Last office visit with prescribing clinician: 3/7/2024   Last telemedicine visit with prescribing clinician: Visit date not found   Next office visit with prescribing clinician: 5/30/2024     Additional details provided by patient: Citalopram Hydrobromide and   Celecoxib  not in the list  Does the patient have less than a 3 day supply:  [] Yes  [x] No    Would you like a call back once the refill request has been completed: [] Yes [] No    If the office needs to give you a call back, can they leave a voicemail: [x] Yes [] No    Ann Allen   05/21/24 08:05 EDT         "

## 2024-05-30 ENCOUNTER — OFFICE VISIT (OUTPATIENT)
Dept: FAMILY MEDICINE CLINIC | Facility: CLINIC | Age: 59
End: 2024-05-30
Payer: COMMERCIAL

## 2024-05-30 VITALS
DIASTOLIC BLOOD PRESSURE: 70 MMHG | SYSTOLIC BLOOD PRESSURE: 130 MMHG | OXYGEN SATURATION: 96 % | BODY MASS INDEX: 26.49 KG/M2 | TEMPERATURE: 98.2 F | HEART RATE: 67 BPM | HEIGHT: 65 IN | WEIGHT: 159 LBS

## 2024-05-30 DIAGNOSIS — E78.6 LOW HDL (UNDER 40): ICD-10-CM

## 2024-05-30 DIAGNOSIS — I10 PRIMARY HYPERTENSION: Primary | ICD-10-CM

## 2024-05-30 DIAGNOSIS — F41.9 ANXIETY: ICD-10-CM

## 2024-05-30 PROCEDURE — 99214 OFFICE O/P EST MOD 30 MIN: CPT | Performed by: FAMILY MEDICINE

## 2024-05-30 RX ORDER — LOSARTAN POTASSIUM 100 MG/1
100 TABLET ORAL DAILY
Qty: 90 TABLET | Refills: 1 | Status: SHIPPED | OUTPATIENT
Start: 2024-05-30

## 2024-05-30 NOTE — PROGRESS NOTES
Chief Complaint   Patient presents with    Follow-up     3 month       Hypertension        Subjective     Romelia Jadyn Young  has a past medical history of Allergic, Anemia, unspecified, Ankle sprain (06/21/2018), Anxiety, Arthritis, Bursitis of hip, right (04/15/2018), Bursitis of right shoulder (09/25/2018), Callus, Chronic allergic rhinitis, Concussion (08/2012), GERD (gastroesophageal reflux disease), Hemorrhoids, Hernia, hiatal, History of transfusion, Infectious viral hepatitis (C antibodies), Ingrown toenail (Past), Left ankle pain (06/26/2018), Menopause, Pain of right hip joint (04/25/2018), Plantar fasciitis (Past), Right shoulder pain (04/25/2018), Seasonal allergies, Sensitive skin, Shoulder impingement, right (09/25/2018), Shoulder tendinitis (07/09/2015), Shoulder tendinitis, right (04/25/2018), and Trochanteric bursitis, right hip (06/26/2018).    Hypertension-she does check her blood pressure at home on a regular basis.  Typically its in the 130s over 80s.    Hyperlipidemia-she is taking her atorvastatin on a daily basis.    Anxiety-we attempted to change her to Trintellix previously.  She states even with the co-pay card it was going to be exorbitantly expensive.  She has thus continue on her citalopram and is currently doing well.    Osteoarthritis.  She has lots of shoulder and knee pain with increased physical activity.  Every day she takes her Celebrex and an extra strength Tylenol with good management of her symptoms.        PHQ-2 Depression Screening  Little interest or pleasure in doing things?     Feeling down, depressed, or hopeless?     PHQ-2 Total Score     PHQ-9 Depression Screening  Little interest or pleasure in doing things?     Feeling down, depressed, or hopeless?     Trouble falling or staying asleep, or sleeping too much?     Feeling tired or having little energy?     Poor appetite or overeating?     Feeling bad about yourself - or that you are a failure or have let yourself or  your family down?     Trouble concentrating on things, such as reading the newspaper or watching television?     Moving or speaking so slowly that other people could have noticed? Or the opposite - being so fidgety or restless that you have been moving around a lot more than usual?     Thoughts that you would be better off dead, or of hurting yourself in some way?     PHQ-9 Total Score     If you checked off any problems, how difficult have these problems made it for you to do your work, take care of things at home, or get along with other people?       No Known Allergies    Prior to Admission medications    Medication Sig Start Date End Date Taking? Authorizing Provider   acyclovir (ZOVIRAX) 5 % ointment Use as needed for cold sores. 8/2/23  Yes    atorvastatin (Lipitor) 10 MG tablet Take 1 tablet by mouth Every Night. 9/14/23  Yes Avery Espinosa DO   celecoxib (CeleBREX) 100 MG capsule Take 1 capsule by mouth Daily. 5/21/24  Yes Avery Espinosa DO   cetirizine (zyrTEC) 10 MG tablet Zyrtec oral tablet 10 mg take 1 tablet (10 mg) by oral route once daily   Active   Yes Erwin Mason MD   citalopram (CeleXA) 40 MG tablet Take 1 tablet by mouth Daily. 5/21/24  Yes Avery Espinosa DO   Docusate Calcium (STOOL SOFTENER PO) Take  by mouth As Needed.   Yes Erwin Mason MD   famotidine (PEPCID) 20 MG tablet Take 1 tablet by mouth.   Yes Erwin Mason MD   metaxalone (Skelaxin) 800 MG tablet Take 1 tablet by mouth 3 (Three) Times a Day As Needed for Muscle Spasms. 8/28/23  Yes Avery Espinosa DO   metoprolol succinate XL (Toprol XL) 25 MG 24 hr tablet Take 1 tablet by mouth Daily. 5/21/24  Yes Avery Espinosa DO   promethazine (PHENERGAN) 12.5 MG tablet Take 1 tablet by mouth every 6 hours as needed 3/20/23  Yes    guaiFENesin-codeine (GUAIFENESIN AC) 100-10 MG/5ML liquid Take 5 mL by mouth 3 (Three) Times a Day As Needed for Cough.  Patient not taking:  Reported on 2024 3/8/24 5/30/24  Avery Espinosa DO        Patient Active Problem List   Diagnosis    Trochanteric bursitis    Bilateral hip pain    GERD (gastroesophageal reflux disease)    Anemia    Bursitis    Disorder of bursae and tendons in shoulder region    Dupuytren contracture    Left ankle pain    Right shoulder pain    Chronic allergic rhinitis    Shoulder impingement, right    Sprain of ankle    Trochanteric bursitis of right hip    Pain of right hip joint    Bursitis of shoulder    Anxiety    Low HDL (under 40)    Anemia, unspecified    Encounter for annual routine gynecological examination    Screening breast examination    Hypertension    Palpitations    Sinobronchitis        Past Surgical History:   Procedure Laterality Date     SECTION      CLAVICLE SURGERY      clavicle fracture surgery - [facility]    COLONOSCOPY  2017    ENDOSCOPY      OTHER SURGICAL HISTORY      Metal Implant    OTHER SURGICAL HISTORY      Tuboplasty    TOENAIL EXCISION         Social History     Socioeconomic History    Marital status:    Tobacco Use    Smoking status: Former     Current packs/day: 0.00     Average packs/day: 0.3 packs/day for 12.0 years (3.0 ttl pk-yrs)     Types: Cigarettes     Start date: 1983     Quit date: 1995     Years since quittin.4    Smokeless tobacco: Never   Vaping Use    Vaping status: Never Used   Substance and Sexual Activity    Alcohol use: Never    Drug use: Never    Sexual activity: Defer       Family History   Problem Relation Age of Onset    Heart disease Mother     Hypertension Mother     Arthritis Mother     Osteoporosis Mother     Heart disease Father     Hypertension Father     Diabetes Father     Arthritis Father     Heart disease Sister     Diabetes Sister     Urolithiasis Brother        Family history, surgical history, past medical history, Allergies and meds reviewed with patient today and updated in [employer] EMR.     ROS:  Review of  "Systems   Constitutional:  Positive for fatigue.   HENT:  Negative for congestion, postnasal drip and rhinorrhea.    Eyes:  Negative for blurred vision and visual disturbance.   Respiratory:  Negative for cough, chest tightness, shortness of breath and wheezing.    Cardiovascular:  Positive for palpitations. Negative for chest pain.   Musculoskeletal:  Positive for arthralgias.   Allergic/Immunologic: Negative for environmental allergies.   Neurological:  Negative for headache.   Psychiatric/Behavioral:  Negative for depressed mood. The patient is not nervous/anxious.        OBJECTIVE:  Vitals:    05/30/24 1344   BP: 145/82   BP Location: Right arm   Patient Position: Sitting   Pulse: 67   Temp: 98.2 °F (36.8 °C)   SpO2: 96%   Weight: 72.1 kg (159 lb)   Height: 165.1 cm (65\")     No results found.   Body mass index is 26.46 kg/m².  No LMP recorded (lmp unknown). Patient is postmenopausal.    The ASCVD Risk score (Tricia DK, et al., 2019) failed to calculate for the following reasons:    The valid total cholesterol range is 130 to 320 mg/dL     Physical Exam  Vitals and nursing note reviewed.   Constitutional:       General: She is not in acute distress.     Appearance: Normal appearance. She is normal weight.   HENT:      Head: Normocephalic.   Cardiovascular:      Rate and Rhythm: Normal rate and regular rhythm.      Heart sounds: Normal heart sounds. No murmur heard.  Pulmonary:      Effort: Pulmonary effort is normal.      Breath sounds: Normal breath sounds. No wheezing or rhonchi.   Neurological:      Mental Status: She is alert and oriented to person, place, and time.   Psychiatric:         Mood and Affect: Mood normal.         Thought Content: Thought content normal.         Judgment: Judgment normal.         Procedures    No visits with results within 30 Day(s) from this visit.   Latest known visit with results is:   Office Visit on 03/07/2024   Component Date Value Ref Range Status    Glucose 03/07/2024 73  " 65 - 99 mg/dL Final    BUN 03/07/2024 7  6 - 20 mg/dL Final    Creatinine 03/07/2024 0.75  0.57 - 1.00 mg/dL Final    Sodium 03/07/2024 140  136 - 145 mmol/L Final    Potassium 03/07/2024 3.4 (L)  3.5 - 5.2 mmol/L Final    Chloride 03/07/2024 104  98 - 107 mmol/L Final    CO2 03/07/2024 23.1  22.0 - 29.0 mmol/L Final    Calcium 03/07/2024 8.8  8.6 - 10.5 mg/dL Final    Total Protein 03/07/2024 7.6  6.0 - 8.5 g/dL Final    Albumin 03/07/2024 4.1  3.5 - 5.2 g/dL Final    ALT (SGPT) 03/07/2024 23  1 - 33 U/L Final    AST (SGOT) 03/07/2024 36 (H)  1 - 32 U/L Final    Alkaline Phosphatase 03/07/2024 94  39 - 117 U/L Final    Total Bilirubin 03/07/2024 0.2  0.0 - 1.2 mg/dL Final    Globulin 03/07/2024 3.5  gm/dL Final    A/G Ratio 03/07/2024 1.2  g/dL Final    BUN/Creatinine Ratio 03/07/2024 9.3  7.0 - 25.0 Final    Anion Gap 03/07/2024 12.9  5.0 - 15.0 mmol/L Final    eGFR 03/07/2024 92.4  >60.0 mL/min/1.73 Final    Total Cholesterol 03/07/2024 114  0 - 200 mg/dL Final    Triglycerides 03/07/2024 73  0 - 150 mg/dL Final    HDL Cholesterol 03/07/2024 22 (L)  40 - 60 mg/dL Final    LDL Cholesterol  03/07/2024 77  0 - 100 mg/dL Final    VLDL Cholesterol 03/07/2024 15  5 - 40 mg/dL Final    LDL/HDL Ratio 03/07/2024 3.52   Final    WBC 03/07/2024 5.02  3.40 - 10.80 10*3/mm3 Final    RBC 03/07/2024 4.33  3.77 - 5.28 10*6/mm3 Final    Hemoglobin 03/07/2024 13.2  12.0 - 15.9 g/dL Final    Hematocrit 03/07/2024 39.8  34.0 - 46.6 % Final    MCV 03/07/2024 91.9  79.0 - 97.0 fL Final    MCH 03/07/2024 30.5  26.6 - 33.0 pg Final    MCHC 03/07/2024 33.2  31.5 - 35.7 g/dL Final    RDW 03/07/2024 12.7  12.3 - 15.4 % Final    RDW-SD 03/07/2024 43.0  37.0 - 54.0 fl Final    MPV 03/07/2024 10.9  6.0 - 12.0 fL Final    Platelets 03/07/2024 465 (H)  140 - 450 10*3/mm3 Final    TSH 03/07/2024 2.230  0.270 - 4.200 uIU/mL Final    Free T4 03/07/2024 1.19  0.93 - 1.70 ng/dL Final    T4 results may be falsely increased if patient taking Biotin.        ASSESSMENT/ PLAN:    Diagnoses and all orders for this visit:    1. Primary hypertension (Primary)  Assessment & Plan:  She has noticed some increased fatigue since taking the metoprolol.  She also has noticed that her heart rate has been somewhat lower with rest and with physical activity.  Will make a change in medication to see if her lethargy improves.      2. Low HDL (under 40)  Assessment & Plan:  Will continue her atorvastatin daily.      3. Anxiety  Assessment & Plan:  Her anxiety overall is stable at times time continue with the citalopram.      Other orders  -     losartan (Cozaar) 100 MG tablet; Take 1 tablet by mouth Daily.  Dispense: 90 tablet; Refill: 1               Orders Placed Today:     New Medications Ordered This Visit   Medications    losartan (Cozaar) 100 MG tablet     Sig: Take 1 tablet by mouth Daily.     Dispense:  90 tablet     Refill:  1        Management Plan:     An After Visit Summary was printed and given to the patient at discharge.    Follow-up: Return in about 3 months (around 8/30/2024) for Recheck.    Avery Espinosa DO 5/30/2024 14:11 EDT  This note was electronically signed.

## 2024-05-30 NOTE — ASSESSMENT & PLAN NOTE
She has noticed some increased fatigue since taking the metoprolol.  She also has noticed that her heart rate has been somewhat lower with rest and with physical activity.  Will make a change in medication to see if her lethargy improves.

## 2024-08-01 ENCOUNTER — TELEPHONE (OUTPATIENT)
Dept: FAMILY MEDICINE CLINIC | Facility: CLINIC | Age: 59
End: 2024-08-01
Payer: COMMERCIAL

## 2024-08-01 DIAGNOSIS — I10 PRIMARY HYPERTENSION: Primary | ICD-10-CM

## 2024-08-01 NOTE — TELEPHONE ENCOUNTER
"Caller: Romelia Young \"DEDE\"    Relationship: Self    Best call back number: [phone]     What orders are you requesting (i.e. lab or imaging): LAB ORDERS    In what timeframe would the patient need to come in: ASAP    Where will you receive your lab/imaging services: [facility]    Additional notes: PATIENT IS WANTING TO DO LABS SOON.          "

## 2024-08-05 ENCOUNTER — TELEPHONE (OUTPATIENT)
Dept: FAMILY MEDICINE CLINIC | Facility: CLINIC | Age: 59
End: 2024-08-05
Payer: COMMERCIAL

## 2024-08-13 ENCOUNTER — LAB (OUTPATIENT)
Dept: LAB | Facility: HOSPITAL | Age: 59
End: 2024-08-13
Payer: COMMERCIAL

## 2024-08-13 LAB
ALBUMIN SERPL-MCNC: 4.2 G/DL (ref 3.5–5.2)
ALBUMIN/GLOB SERPL: 1.4 G/DL
ALP SERPL-CCNC: 112 U/L (ref 39–117)
ALT SERPL W P-5'-P-CCNC: 17 U/L (ref 1–33)
ANION GAP SERPL CALCULATED.3IONS-SCNC: 8.3 MMOL/L (ref 5–15)
AST SERPL-CCNC: 20 U/L (ref 1–32)
BILIRUB SERPL-MCNC: 0.4 MG/DL (ref 0–1.2)
BUN SERPL-MCNC: 16 MG/DL (ref 6–20)
BUN/CREAT SERPL: 17.8 (ref 7–25)
CALCIUM SPEC-SCNC: 9.4 MG/DL (ref 8.6–10.5)
CHLORIDE SERPL-SCNC: 103 MMOL/L (ref 98–107)
CHOLEST SERPL-MCNC: 155 MG/DL (ref 0–200)
CO2 SERPL-SCNC: 25.7 MMOL/L (ref 22–29)
CREAT SERPL-MCNC: 0.9 MG/DL (ref 0.57–1)
EGFRCR SERPLBLD CKD-EPI 2021: 74.3 ML/MIN/1.73
GLOBULIN UR ELPH-MCNC: 3.1 GM/DL
GLUCOSE SERPL-MCNC: 94 MG/DL (ref 65–99)
HDLC SERPL-MCNC: 25 MG/DL (ref 40–60)
LDLC SERPL CALC-MCNC: 110 MG/DL (ref 0–100)
LDLC/HDLC SERPL: 4.32 {RATIO}
POTASSIUM SERPL-SCNC: 3.8 MMOL/L (ref 3.5–5.2)
PROT SERPL-MCNC: 7.3 G/DL (ref 6–8.5)
SODIUM SERPL-SCNC: 137 MMOL/L (ref 136–145)
TRIGL SERPL-MCNC: 110 MG/DL (ref 0–150)
VLDLC SERPL-MCNC: 20 MG/DL (ref 5–40)

## 2024-08-13 PROCEDURE — 80061 LIPID PANEL: CPT | Performed by: FAMILY MEDICINE

## 2024-08-13 PROCEDURE — 80053 COMPREHEN METABOLIC PANEL: CPT | Performed by: FAMILY MEDICINE

## 2024-08-22 ENCOUNTER — OFFICE VISIT (OUTPATIENT)
Dept: FAMILY MEDICINE CLINIC | Facility: CLINIC | Age: 59
End: 2024-08-22
Payer: COMMERCIAL

## 2024-08-22 VITALS
TEMPERATURE: 98.9 F | BODY MASS INDEX: 26.29 KG/M2 | HEIGHT: 65 IN | SYSTOLIC BLOOD PRESSURE: 123 MMHG | OXYGEN SATURATION: 98 % | WEIGHT: 157.8 LBS | DIASTOLIC BLOOD PRESSURE: 82 MMHG | HEART RATE: 66 BPM

## 2024-08-22 DIAGNOSIS — M25.50 ARTHRALGIA, UNSPECIFIED JOINT: ICD-10-CM

## 2024-08-22 DIAGNOSIS — E78.6 LOW HDL (UNDER 40): ICD-10-CM

## 2024-08-22 DIAGNOSIS — J30.9 CHRONIC ALLERGIC RHINITIS: Primary | ICD-10-CM

## 2024-08-22 DIAGNOSIS — Z11.59 ENCOUNTER FOR HEPATITIS C SCREENING TEST FOR LOW RISK PATIENT: ICD-10-CM

## 2024-08-22 DIAGNOSIS — F41.9 ANXIETY: ICD-10-CM

## 2024-08-22 DIAGNOSIS — Z00.00 ANNUAL PHYSICAL EXAM: ICD-10-CM

## 2024-08-22 DIAGNOSIS — I10 PRIMARY HYPERTENSION: ICD-10-CM

## 2024-08-22 DIAGNOSIS — K21.9 GASTROESOPHAGEAL REFLUX DISEASE WITHOUT ESOPHAGITIS: ICD-10-CM

## 2024-08-22 DIAGNOSIS — Z12.39 SCREENING BREAST EXAMINATION: ICD-10-CM

## 2024-08-22 LAB — HCV AB SER QL: NORMAL

## 2024-08-22 PROCEDURE — 86803 HEPATITIS C AB TEST: CPT | Performed by: FAMILY MEDICINE

## 2024-08-22 PROCEDURE — 99396 PREV VISIT EST AGE 40-64: CPT | Performed by: FAMILY MEDICINE

## 2024-08-22 RX ORDER — PROMETHAZINE HYDROCHLORIDE 12.5 MG/1
TABLET ORAL
Qty: 20 TABLET | Refills: 0 | Status: SHIPPED | OUTPATIENT
Start: 2024-08-22

## 2024-08-22 RX ORDER — CETIRIZINE HYDROCHLORIDE 10 MG/1
10 TABLET ORAL DAILY
Qty: 90 TABLET | Refills: 3 | Status: SHIPPED | OUTPATIENT
Start: 2024-08-22

## 2024-08-22 RX ORDER — FAMOTIDINE 20 MG/1
20 TABLET, FILM COATED ORAL 2 TIMES DAILY PRN
Qty: 180 TABLET | Refills: 3 | Status: SHIPPED | OUTPATIENT
Start: 2024-08-22

## 2024-08-22 RX ORDER — ATORVASTATIN CALCIUM 10 MG/1
10 TABLET, FILM COATED ORAL NIGHTLY
Qty: 90 TABLET | Refills: 3 | Status: SHIPPED | OUTPATIENT
Start: 2024-08-22

## 2024-08-22 RX ORDER — DESVENLAFAXINE SUCCINATE 50 MG/1
50 TABLET, EXTENDED RELEASE ORAL DAILY
Qty: 90 TABLET | Refills: 1 | Status: SHIPPED | OUTPATIENT
Start: 2024-08-22

## 2024-08-22 RX ORDER — ACYCLOVIR 50 MG/G
OINTMENT TOPICAL
Qty: 15 G | Refills: 1 | Status: SHIPPED | OUTPATIENT
Start: 2024-08-22

## 2024-08-22 RX ORDER — CITALOPRAM 40 MG/1
40 TABLET ORAL DAILY
Qty: 90 TABLET | Refills: 3 | Status: CANCELLED | OUTPATIENT
Start: 2024-08-22

## 2024-08-22 RX ORDER — LOSARTAN POTASSIUM 100 MG/1
100 TABLET ORAL DAILY
Qty: 90 TABLET | Refills: 1 | Status: SHIPPED | OUTPATIENT
Start: 2024-08-22

## 2024-08-22 RX ORDER — CELECOXIB 100 MG/1
100 CAPSULE ORAL DAILY
Qty: 90 CAPSULE | Refills: 3 | Status: SHIPPED | OUTPATIENT
Start: 2024-08-22

## 2024-08-22 RX ORDER — FLUTICASONE PROPIONATE 50 MCG
2 SPRAY, SUSPENSION (ML) NASAL DAILY
Qty: 48 ML | Refills: 3 | Status: SHIPPED | OUTPATIENT
Start: 2024-08-22

## 2024-08-22 NOTE — ASSESSMENT & PLAN NOTE
Her anxiety is rather persistent.  She does not feel that the citalopram is helping as much as it once used to.  She has tried multiple ones in the past.  Will give her a trial of Pristiq.  If she has difficulty with this or does not seem to be effective we may want to try a GeneSight to look further.

## 2024-08-22 NOTE — PROGRESS NOTES
Chief Complaint   Patient presents with    Gynecologic Exam        Subjective     Romeliarobby Young  has a past medical history of Anemia, unspecified, Ankle sprain (06/21/2018), Anxiety, Arthritis, Bursitis of hip, right (04/15/2018), Bursitis of right shoulder (09/25/2018), Callus, Chronic allergic rhinitis, Concussion (08/2012), GERD (gastroesophageal reflux disease), Hemorrhoids, Hernia, hiatal, History of transfusion, Infectious viral hepatitis (C antibodies), Ingrown toenail (Past), Left ankle pain (06/26/2018), Menopause, Pain of right hip joint (04/25/2018), Plantar fasciitis (Past), Right shoulder pain (04/25/2018), Seasonal allergies, Sensitive skin, Shoulder impingement, right (09/25/2018), Shoulder tendinitis (07/09/2015), Shoulder tendinitis, right (04/25/2018), and Trochanteric bursitis, right hip (06/26/2018).    Annual exam-overall she is doing well.  She does not have any specific concerns or complaints.  Her employer will give her an influenza vaccine coming this fall.    Hypertension-her home blood pressure readings at home and at work are a little bit more higher than here today.  It is great here today at 123/82.    Hyperlipidemia-she takes her atorvastatin on a daily basis.        PHQ-2 Depression Screening  Little interest or pleasure in doing things?     Feeling down, depressed, or hopeless?     PHQ-2 Total Score     PHQ-9 Depression Screening  Little interest or pleasure in doing things?     Feeling down, depressed, or hopeless?     Trouble falling or staying asleep, or sleeping too much?     Feeling tired or having little energy?     Poor appetite or overeating?     Feeling bad about yourself - or that you are a failure or have let yourself or your family down?     Trouble concentrating on things, such as reading the newspaper or watching television?     Moving or speaking so slowly that other people could have noticed? Or the opposite - being so fidgety or restless that you have been  moving around a lot more than usual?     Thoughts that you would be better off dead, or of hurting yourself in some way?     PHQ-9 Total Score     If you checked off any problems, how difficult have these problems made it for you to do your work, take care of things at home, or get along with other people?       No Known Allergies    Prior to Admission medications    Medication Sig Start Date End Date Taking? Authorizing Provider   acyclovir (ZOVIRAX) 5 % ointment Use as needed for cold sores. 8/2/23  Yes    atorvastatin (Lipitor) 10 MG tablet Take 1 tablet by mouth Every Night. 9/14/23  Yes Avery Espinosa DO   celecoxib (CeleBREX) 100 MG capsule Take 1 capsule by mouth Daily. 5/21/24  Yes Avery Espinosa DO   cetirizine (zyrTEC) 10 MG tablet Zyrtec oral tablet 10 mg take 1 tablet (10 mg) by oral route once daily   Active   Yes Erwin Mason MD   citalopram (CeleXA) 40 MG tablet Take 1 tablet by mouth Daily. 5/21/24  Yes Avery Espinosa DO   Docusate Calcium (STOOL SOFTENER PO) Take  by mouth As Needed.   Yes Erwin Mason MD   famotidine (PEPCID) 20 MG tablet Take 1 tablet by mouth.   Yes Erwin Mason MD   losartan (Cozaar) 100 MG tablet Take 1 tablet by mouth Daily. 5/30/24  Yes Avery Espinosa DO   metaxalone (Skelaxin) 800 MG tablet Take 1 tablet by mouth 3 (Three) Times a Day As Needed for Muscle Spasms. 8/28/23  Yes Avery Espinosa DO   promethazine (PHENERGAN) 12.5 MG tablet Take 1 tablet by mouth every 6 hours as needed 3/20/23  Yes         Patient Active Problem List   Diagnosis    Trochanteric bursitis    Bilateral hip pain    GERD (gastroesophageal reflux disease)    Anemia    Bursitis    Disorder of bursae and tendons in shoulder region    Dupuytren contracture    Left ankle pain    Right shoulder pain    Chronic allergic rhinitis    Shoulder impingement, right    Sprain of ankle    Trochanteric bursitis of right hip    Pain of right  hip joint    Bursitis of shoulder    Anxiety    Low HDL (under 40)    Anemia, unspecified    Encounter for annual routine gynecological examination    Screening breast examination    Hypertension    Palpitations    Sinobronchitis    Annual physical exam    Encounter for hepatitis C screening test for low risk patient        Past Surgical History:   Procedure Laterality Date     SECTION  2002    CLAVICLE SURGERY  2012    clavicle fracture surgery - [facility]    COLONOSCOPY  2017    ENDOSCOPY      OTHER SURGICAL HISTORY      Metal Implant    OTHER SURGICAL HISTORY      Tuboplasty    TOENAIL EXCISION         Social History     Socioeconomic History    Marital status:    Tobacco Use    Smoking status: Former     Current packs/day: 0.00     Average packs/day: 0.3 packs/day for 12.0 years (3.0 ttl pk-yrs)     Types: Cigarettes     Start date: 1983     Quit date: 1995     Years since quittin.6    Smokeless tobacco: Never   Vaping Use    Vaping status: Never Used   Substance and Sexual Activity    Alcohol use: Never    Drug use: Never    Sexual activity: Defer       Family History   Problem Relation Age of Onset    Heart disease Mother     Hypertension Mother     Arthritis Mother     Osteoporosis Mother     Heart disease Father     Hypertension Father     Diabetes Father     Arthritis Father     Heart disease Sister     Diabetes Sister     Urolithiasis Brother        Family history, surgical history, past medical history, Allergies and meds reviewed with patient today and updated in [employer] EMR.     ROS:  Review of Systems   Constitutional:  Negative for fatigue.   HENT:  Negative for congestion, postnasal drip and rhinorrhea.    Eyes:  Negative for blurred vision and visual disturbance.   Respiratory:  Negative for cough, chest tightness, shortness of breath and wheezing.    Cardiovascular:  Negative for chest pain and palpitations.   Gastrointestinal:  Negative for constipation and diarrhea.  "  Allergic/Immunologic: Positive for environmental allergies.   Neurological:  Negative for headache.   Psychiatric/Behavioral:  Negative for depressed mood. The patient is nervous/anxious.        OBJECTIVE:  Vitals:    08/22/24 1341   BP: 123/82   BP Location: Left arm   Patient Position: Sitting   Pulse: 66   Temp: 98.9 °F (37.2 °C)   SpO2: 98%   Weight: 71.6 kg (157 lb 12.8 oz)   Height: 165.1 cm (65\")     No results found.   Body mass index is 26.26 kg/m².  No LMP recorded (lmp unknown). Patient is postmenopausal.    The 10-year ASCVD risk score (Tricia CHURCH, et al., 2019) is: 4.9%    Values used to calculate the score:      Age: 58 years      Sex: Female      Is Non- : No      Diabetic: No      Tobacco smoker: No      Systolic Blood Pressure: 123 mmHg      Is BP treated: Yes      HDL Cholesterol: 25 mg/dL      Total Cholesterol: 155 mg/dL     Physical Exam  Vitals and nursing note reviewed.   Constitutional:       General: She is not in acute distress.     Appearance: Normal appearance. She is normal weight.   HENT:      Head: Normocephalic.      Right Ear: Tympanic membrane, ear canal and external ear normal.      Left Ear: Tympanic membrane, ear canal and external ear normal.      Nose: Nose normal.      Mouth/Throat:      Mouth: Mucous membranes are moist.      Pharynx: Oropharynx is clear.   Eyes:      General: No scleral icterus.     Conjunctiva/sclera: Conjunctivae normal.      Pupils: Pupils are equal, round, and reactive to light.   Cardiovascular:      Rate and Rhythm: Normal rate and regular rhythm.      Pulses: Normal pulses.      Heart sounds: Normal heart sounds. No murmur heard.  Pulmonary:      Effort: Pulmonary effort is normal.      Breath sounds: Normal breath sounds. No wheezing, rhonchi or rales.   Chest:   Breasts:     Breasts are symmetrical.      Right: Normal. No inverted nipple, mass, nipple discharge, skin change or tenderness.      Left: Normal. No inverted " nipple, mass, nipple discharge, skin change or tenderness.   Abdominal:      General: Abdomen is flat. Bowel sounds are normal.      Palpations: Abdomen is soft. There is no mass.      Tenderness: There is no abdominal tenderness.   Musculoskeletal:      Cervical back: Neck supple. No rigidity or tenderness.   Lymphadenopathy:      Cervical: No cervical adenopathy.      Upper Body:      Right upper body: No supraclavicular or axillary adenopathy.      Left upper body: No supraclavicular or axillary adenopathy.   Skin:     General: Skin is warm and dry.      Coloration: Skin is not jaundiced.      Findings: No rash.   Neurological:      General: No focal deficit present.      Mental Status: She is alert and oriented to person, place, and time.   Psychiatric:         Mood and Affect: Mood normal.         Thought Content: Thought content normal.         Judgment: Judgment normal.         Procedures    Lab Requisition on 08/13/2024   Component Date Value Ref Range Status    Hep B S Ab 08/13/2024 Non-Reactive   Final    Mumps IgG 08/13/2024 90.2  Immune >10.9 AU/mL Final                                    Negative         <9.0                                  Equivocal  9.0 - 10.9                                  Positive        >10.9  A positive result generally indicates past exposure to  Mumps virus or previous vaccination.    Rubeola IgG 08/13/2024 58.4  Immune >16.4 AU/mL Final                                     Negative        <13.5                                   Equivocal 13.5 - 16.4                                   Positive        >16.4  Presence of antibodies to Rubeola is presumptive evidence  of immunity except when acute infection is suspected.   Orders Only on 08/01/2024   Component Date Value Ref Range Status    Glucose 08/13/2024 94  65 - 99 mg/dL Final    BUN 08/13/2024 16  6 - 20 mg/dL Final    Creatinine 08/13/2024 0.90  0.57 - 1.00 mg/dL Final    Sodium 08/13/2024 137  136 - 145 mmol/L Final     Potassium 08/13/2024 3.8  3.5 - 5.2 mmol/L Final    Chloride 08/13/2024 103  98 - 107 mmol/L Final    CO2 08/13/2024 25.7  22.0 - 29.0 mmol/L Final    Calcium 08/13/2024 9.4  8.6 - 10.5 mg/dL Final    Total Protein 08/13/2024 7.3  6.0 - 8.5 g/dL Final    Albumin 08/13/2024 4.2  3.5 - 5.2 g/dL Final    ALT (SGPT) 08/13/2024 17  1 - 33 U/L Final    AST (SGOT) 08/13/2024 20  1 - 32 U/L Final    Alkaline Phosphatase 08/13/2024 112  39 - 117 U/L Final    Total Bilirubin 08/13/2024 0.4  0.0 - 1.2 mg/dL Final    Globulin 08/13/2024 3.1  gm/dL Final    A/G Ratio 08/13/2024 1.4  g/dL Final    BUN/Creatinine Ratio 08/13/2024 17.8  7.0 - 25.0 Final    Anion Gap 08/13/2024 8.3  5.0 - 15.0 mmol/L Final    eGFR 08/13/2024 74.3  >60.0 mL/min/1.73 Final    Total Cholesterol 08/13/2024 155  0 - 200 mg/dL Final    Triglycerides 08/13/2024 110  0 - 150 mg/dL Final    HDL Cholesterol 08/13/2024 25 (L)  40 - 60 mg/dL Final    LDL Cholesterol  08/13/2024 110 (H)  0 - 100 mg/dL Final    VLDL Cholesterol 08/13/2024 20  5 - 40 mg/dL Final    LDL/HDL Ratio 08/13/2024 4.32   Final       ASSESSMENT/ PLAN:    Diagnoses and all orders for this visit:    1. Chronic allergic rhinitis (Primary)  Assessment & Plan:  Her allergies are well-controlled at this time with her Zyrtec and Flonase daily.      2. Low HDL (under 40)  Assessment & Plan:  Will update her lipid profile with her routine labs.    Orders:  -     atorvastatin (Lipitor) 10 MG tablet; Take 1 tablet by mouth Every Night.  Dispense: 90 tablet; Refill: 3    3. Arthralgia, unspecified joint  -     celecoxib (CeleBREX) 100 MG capsule; Take 1 capsule by mouth Daily.  Dispense: 90 capsule; Refill: 3    4. Anxiety  Assessment & Plan:  Her anxiety is rather persistent.  She does not feel that the citalopram is helping as much as it once used to.  She has tried multiple ones in the past.  Will give her a trial of Pristiq.  If she has difficulty with this or does not seem to be effective we may  want to try a GeneSight to look further.      5. Gastroesophageal reflux disease without esophagitis  -     famotidine (PEPCID) 20 MG tablet; Take 1 tablet by mouth 2 (Two) Times a Day As Needed for Heartburn.  Dispense: 180 tablet; Refill: 3    6. Primary hypertension  Assessment & Plan:  Her blood pressure here today is great.  Will continue her current meds and update her labs      7. Annual physical exam  Assessment & Plan:  Physically she is doing well.  Will get her set up for routine labs and she will get her influenza vaccine this fall.      8. Screening breast examination  Assessment & Plan:  Her physical exam for her breast is fine.  Will set her up for a routine mammogram.      9. Encounter for hepatitis C screening test for low risk patient  -     Hepatitis C Antibody    Other orders  -     promethazine (PHENERGAN) 12.5 MG tablet; Take 1 tablet by mouth every 6 hours as needed  Dispense: 20 tablet; Refill: 0  -     acyclovir (ZOVIRAX) 5 % ointment; Use as needed for cold sores.  Dispense: 15 g; Refill: 1  -     cetirizine (zyrTEC) 10 MG tablet; Take 1 tablet by mouth Daily.  Dispense: 90 tablet; Refill: 3  -     losartan (Cozaar) 100 MG tablet; Take 1 tablet by mouth Daily.  Dispense: 90 tablet; Refill: 1  -     fluticasone (FLONASE) 50 MCG/ACT nasal spray; 2 sprays into the nostril(s) as directed by provider Daily.  Dispense: 48 mL; Refill: 3  -     desvenlafaxine (Pristiq) 50 MG 24 hr tablet; Take 1 tablet by mouth Daily.  Dispense: 90 tablet; Refill: 1               Orders Placed Today:     New Medications Ordered This Visit   Medications    promethazine (PHENERGAN) 12.5 MG tablet     Sig: Take 1 tablet by mouth every 6 hours as needed     Dispense:  20 tablet     Refill:  0    acyclovir (ZOVIRAX) 5 % ointment     Sig: Use as needed for cold sores.     Dispense:  15 g     Refill:  1    atorvastatin (Lipitor) 10 MG tablet     Sig: Take 1 tablet by mouth Every Night.     Dispense:  90 tablet     Refill:   3    celecoxib (CeleBREX) 100 MG capsule     Sig: Take 1 capsule by mouth Daily.     Dispense:  90 capsule     Refill:  3    cetirizine (zyrTEC) 10 MG tablet     Sig: Take 1 tablet by mouth Daily.     Dispense:  90 tablet     Refill:  3    famotidine (PEPCID) 20 MG tablet     Sig: Take 1 tablet by mouth 2 (Two) Times a Day As Needed for Heartburn.     Dispense:  180 tablet     Refill:  3    losartan (Cozaar) 100 MG tablet     Sig: Take 1 tablet by mouth Daily.     Dispense:  90 tablet     Refill:  1    fluticasone (FLONASE) 50 MCG/ACT nasal spray     Si sprays into the nostril(s) as directed by provider Daily.     Dispense:  48 mL     Refill:  3    desvenlafaxine (Pristiq) 50 MG 24 hr tablet     Sig: Take 1 tablet by mouth Daily.     Dispense:  90 tablet     Refill:  1        Management Plan:     An After Visit Summary was printed and given to the patient at discharge.    Follow-up: Return in about 6 months (around 2025) for Recheck.    Avery Espinosa DO 2024 14:32 EDT  This note was electronically signed.

## 2024-08-22 NOTE — ASSESSMENT & PLAN NOTE
Physically she is doing well.  Will get her set up for routine labs and she will get her influenza vaccine this fall.

## 2024-09-11 ENCOUNTER — OFFICE VISIT (OUTPATIENT)
Dept: FAMILY MEDICINE CLINIC | Facility: CLINIC | Age: 59
End: 2024-09-11
Payer: COMMERCIAL

## 2024-09-11 VITALS
TEMPERATURE: 98.1 F | DIASTOLIC BLOOD PRESSURE: 88 MMHG | OXYGEN SATURATION: 99 % | BODY MASS INDEX: 25.99 KG/M2 | WEIGHT: 156 LBS | SYSTOLIC BLOOD PRESSURE: 150 MMHG | HEART RATE: 79 BPM | HEIGHT: 65 IN

## 2024-09-11 DIAGNOSIS — R30.0 DYSURIA: Primary | ICD-10-CM

## 2024-09-11 DIAGNOSIS — N30.01 ACUTE CYSTITIS WITH HEMATURIA: ICD-10-CM

## 2024-09-11 DIAGNOSIS — R82.90 ABNORMAL URINALYSIS: ICD-10-CM

## 2024-09-11 LAB
BILIRUB BLD-MCNC: NEGATIVE MG/DL
CLARITY, POC: ABNORMAL
COLOR UR: YELLOW
EXPIRATION DATE: ABNORMAL
GLUCOSE UR STRIP-MCNC: NEGATIVE MG/DL
KETONES UR QL: NEGATIVE
LEUKOCYTE EST, POC: ABNORMAL
Lab: ABNORMAL
NITRITE UR-MCNC: POSITIVE MG/ML
PH UR: 6 [PH] (ref 5–8)
PROT UR STRIP-MCNC: ABNORMAL MG/DL
RBC # UR STRIP: ABNORMAL /UL
SP GR UR: 1.01 (ref 1–1.03)
UROBILINOGEN UR QL: ABNORMAL

## 2024-09-11 PROCEDURE — 87088 URINE BACTERIA CULTURE: CPT | Performed by: NURSE PRACTITIONER

## 2024-09-11 PROCEDURE — 87086 URINE CULTURE/COLONY COUNT: CPT | Performed by: NURSE PRACTITIONER

## 2024-09-11 PROCEDURE — 87186 SC STD MICRODIL/AGAR DIL: CPT | Performed by: NURSE PRACTITIONER

## 2024-09-11 PROCEDURE — 99213 OFFICE O/P EST LOW 20 MIN: CPT | Performed by: NURSE PRACTITIONER

## 2024-09-11 PROCEDURE — 81003 URINALYSIS AUTO W/O SCOPE: CPT | Performed by: NURSE PRACTITIONER

## 2024-09-11 RX ORDER — PHENAZOPYRIDINE HYDROCHLORIDE 200 MG/1
200 TABLET, FILM COATED ORAL 3 TIMES DAILY PRN
Qty: 9 TABLET | Refills: 0 | Status: SHIPPED | OUTPATIENT
Start: 2024-09-11

## 2024-09-11 RX ORDER — NITROFURANTOIN 25; 75 MG/1; MG/1
100 CAPSULE ORAL 2 TIMES DAILY
Qty: 14 CAPSULE | Refills: 0 | Status: SHIPPED | OUTPATIENT
Start: 2024-09-11

## 2024-09-11 NOTE — PROGRESS NOTES
ACUTE VISIT     Patient Name: Romelia Young  : 1965   MRN: [id number]     Chief Complaint:    Chief Complaint   Patient presents with    burning with urination    Difficulty Urinating       History of Present Illness: oRmelia Young is a 58 y.o. female who is here today for burning with urination, difficulty urinating and itching.   Symptoms started yesterday. She also noted there was tinge of blood when she whipped.  Patient reports recently she has not been drinking much water    Subjective      Review of Systems:   Review of Systems   Constitutional:  Negative for fever.   Respiratory:  Negative for cough.    Cardiovascular:  Negative for chest pain.   Gastrointestinal:  Negative for abdominal pain, constipation, diarrhea, nausea and vomiting.   Genitourinary:  Positive for dysuria, frequency and urgency. Negative for flank pain.   Musculoskeletal:  Negative for back pain.        Past Medical History:   Past Medical History:   Diagnosis Date    Anemia, unspecified     Ankle sprain 2018    left    Anxiety     Arthritis     Bursitis of hip, right 04/15/2018    Bursitis of right shoulder 2018    Callus     Chronic allergic rhinitis     Concussion 2012    Head injury    GERD (gastroesophageal reflux disease)     Hemorrhoids     Hernia, hiatal     History of transfusion     Infectious viral hepatitis C antibodies    Ingrown toenail Past    Left ankle pain 2018    Menopause     37 yrs old    Pain of right hip joint 2018    Plantar fasciitis Past    Right shoulder pain 2018    Seasonal allergies     Sensitive skin     Shoulder impingement, right 2018    Shoulder tendinitis 2015    Shoulder tendinitis, right 2018    Trochanteric bursitis, right hip 2018       Past Surgical History:   Past Surgical History:   Procedure Laterality Date     SECTION      CLAVICLE SURGERY      clavicle fracture surgery - [facility]    COLONOSCOPY   2017    ENDOSCOPY      OTHER SURGICAL HISTORY      Metal Implant    OTHER SURGICAL HISTORY      Tuboplasty    TOENAIL EXCISION         Family History:   Family History   Problem Relation Age of Onset    Heart disease Mother     Hypertension Mother     Arthritis Mother     Osteoporosis Mother     Heart disease Father     Hypertension Father     Diabetes Father     Arthritis Father     Heart disease Sister     Diabetes Sister     Urolithiasis Brother        Social History:   Social History     Socioeconomic History    Marital status:    Tobacco Use    Smoking status: Former     Current packs/day: 0.00     Average packs/day: 0.3 packs/day for 12.0 years (3.0 ttl pk-yrs)     Types: Cigarettes     Start date: 1983     Quit date: 1995     Years since quittin.7    Smokeless tobacco: Never   Vaping Use    Vaping status: Never Used   Substance and Sexual Activity    Alcohol use: Never    Drug use: Never    Sexual activity: Defer       Medications:     Current Outpatient Medications:     acyclovir (ZOVIRAX) 5 % ointment, Use as needed for cold sores up to 5 times a day, Disp: 15 g, Rfl: 1    atorvastatin (Lipitor) 10 MG tablet, Take 1 tablet by mouth Every Night., Disp: 90 tablet, Rfl: 3    celecoxib (CeleBREX) 100 MG capsule, Take 1 capsule by mouth Daily., Disp: 90 capsule, Rfl: 3    cetirizine (zyrTEC) 10 MG tablet, Take 1 tablet by mouth Daily., Disp: 90 tablet, Rfl: 3    desvenlafaxine (Pristiq) 50 MG 24 hr tablet, Take 1 tablet by mouth Daily., Disp: 90 tablet, Rfl: 1    Docusate Calcium (STOOL SOFTENER PO), Take  by mouth As Needed., Disp: , Rfl:     famotidine (PEPCID) 20 MG tablet, Take 1 tablet by mouth 2 (Two) Times a Day As Needed for Heartburn., Disp: 180 tablet, Rfl: 3    fluticasone (FLONASE) 50 MCG/ACT nasal spray, Instill 2 sprays into the nostril(s) as directed by provider Daily., Disp: 48 mL, Rfl: 3    losartan (Cozaar) 100 MG tablet, Take 1 tablet by mouth Daily., Disp: 90 tablet,  "Rfl: 1    metaxalone (Skelaxin) 800 MG tablet, Take 1 tablet by mouth 3 (Three) Times a Day As Needed for Muscle Spasms., Disp: 90 tablet, Rfl: 1    promethazine (PHENERGAN) 12.5 MG tablet, Take 1 tablet by mouth every 6 hours as needed, Disp: 20 tablet, Rfl: 0    nitrofurantoin, macrocrystal-monohydrate, (Macrobid) 100 MG capsule, Take 1 capsule by mouth 2 (Two) Times a Day., Disp: 14 capsule, Rfl: 0    phenazopyridine (Pyridium) 200 MG tablet, Take 1 tablet by mouth 3 (Three) Times a Day As Needed for Bladder Spasms or Dysuria., Disp: 9 tablet, Rfl: 0    Allergies:   No Known Allergies      Objective     Physical Exam:  Vital Signs:   Vitals:    09/11/24 0954   BP: 150/88   Pulse: 79   Temp: 98.1 °F (36.7 °C)   SpO2: 99%   Weight: 70.8 kg (156 lb)   Height: 165.1 cm (65\")     Body mass index is 25.96 kg/m².     Physical Exam  Cardiovascular:      Rate and Rhythm: Normal rate and regular rhythm.      Heart sounds: Normal heart sounds. No murmur heard.  Pulmonary:      Effort: Pulmonary effort is normal.      Breath sounds: Normal breath sounds.   Abdominal:      General: Bowel sounds are normal.      Palpations: Abdomen is soft.      Tenderness: There is no right CVA tenderness or left CVA tenderness.   Skin:     General: Skin is warm and dry.   Neurological:      Mental Status: She is alert.             Assessment / Plan      Assessment/Plan:   Diagnoses and all orders for this visit:    1. Dysuria (Primary)  -     POC Urinalysis Dipstick, Automated  -     Urine Culture - Urine, Urine, Clean Catch; Future  -     Urine Culture - Urine, Urine, Clean Catch    2. Abnormal urinalysis  -     Urine Culture - Urine, Urine, Clean Catch; Future  -     Urine Culture - Urine, Urine, Clean Catch    3. Acute cystitis with hematuria    Other orders  -     nitrofurantoin, macrocrystal-monohydrate, (Macrobid) 100 MG capsule; Take 1 capsule by mouth 2 (Two) Times a Day.  Dispense: 14 capsule; Refill: 0  -     phenazopyridine " (Pyridium) 200 MG tablet; Take 1 tablet by mouth 3 (Three) Times a Day As Needed for Bladder Spasms or Dysuria.  Dispense: 9 tablet; Refill: 0         Dysuria UA in office is positive will send for culture acute cystitis with hematuria we will treat with Macrobid Pyridium recommend increase water intake avoid irritants such as caffeine and artificial sweeteners will call with urine culture results and further recommendations      Follow Up:   Return if symptoms worsen or fail to improve.    KAITLYN Shepard      Please note that portions of this note were completed with a voice recognition program.

## 2024-09-14 LAB — BACTERIA SPEC AEROBE CULT: ABNORMAL

## 2024-11-06 ENCOUNTER — TELEPHONE (OUTPATIENT)
Dept: FAMILY MEDICINE CLINIC | Facility: CLINIC | Age: 59
End: 2024-11-06

## 2024-11-06 RX ORDER — DESVENLAFAXINE 100 MG/1
100 TABLET, EXTENDED RELEASE ORAL DAILY
Qty: 90 TABLET | Refills: 0 | Status: SHIPPED | OUTPATIENT
Start: 2024-11-06

## 2024-11-06 NOTE — TELEPHONE ENCOUNTER
"  Caller: Romelia Young \"DEDE\"    Relationship: Self    Best call back number:     [phone]       What medication are you requesting: desvenlafaxine (Pristiq)       If a prescription is needed, what is your preferred pharmacy and phone number: [facility] [place]     Additional notes:    PATIENT IS REQUESTING A NEW PRESCRIPTION BE ORDERED WITH AN INCREASE OF MEDICATION LISTED TO THE 75  MG.        "

## 2024-11-06 NOTE — TELEPHONE ENCOUNTER
"  Caller: Romelia Young \"DEDE\"    Relationship: Self    Best call back number: [phone]     What orders are you requesting (i.e. lab or imaging): GENETIC TESTING    Where will you receive your lab/imaging services: [facility].     Additional notes: PLEASE CONTACT PATIENT ONCE ORDERED.         "

## 2024-11-08 NOTE — TELEPHONE ENCOUNTER
Attempted to reach pt, unable to leave vm   HUB TO RELAY:   Dr. Espinosa is asking what exactly you need the genetic testing for

## 2024-11-08 NOTE — TELEPHONE ENCOUNTER
Attempted to reach pt, unable to leave   HUB TO RELAY:     Sent in new Rx for 100mg as that is the next dosage up

## 2025-01-07 ENCOUNTER — TELEPHONE (OUTPATIENT)
Dept: FAMILY MEDICINE CLINIC | Facility: CLINIC | Age: 60
End: 2025-01-07
Payer: COMMERCIAL

## 2025-01-07 DIAGNOSIS — Z12.31 BREAST CANCER SCREENING BY MAMMOGRAM: Primary | ICD-10-CM

## 2025-01-07 NOTE — TELEPHONE ENCOUNTER
"    Caller: Romelia Young \"DEDE\"    Relationship: Self    Best call back number: [phone]    What orders are you requesting (i.e. lab or imaging): ANNUAL MAMMOGRAM     In what timeframe would the patient need to come in: AFTER 02.15.2025    Where will you receive your lab/imaging services: [facility]         "

## 2025-01-31 RX ORDER — DESVENLAFAXINE 100 MG/1
100 TABLET, EXTENDED RELEASE ORAL DAILY
Qty: 90 TABLET | Refills: 0 | Status: SHIPPED | OUTPATIENT
Start: 2025-01-31

## 2025-02-18 ENCOUNTER — HOSPITAL ENCOUNTER (OUTPATIENT)
Dept: MAMMOGRAPHY | Facility: HOSPITAL | Age: 60
Discharge: HOME | End: 2025-02-18
Admitting: FAMILY MEDICINE
Payer: COMMERCIAL

## 2025-02-18 ENCOUNTER — TELEPHONE (OUTPATIENT)
Dept: FAMILY MEDICINE CLINIC | Facility: CLINIC | Age: 60
End: 2025-02-18
Payer: COMMERCIAL

## 2025-02-18 DIAGNOSIS — Z12.31 BREAST CANCER SCREENING BY MAMMOGRAM: ICD-10-CM

## 2025-02-18 PROCEDURE — 77063 BREAST TOMOSYNTHESIS BI: CPT

## 2025-02-18 PROCEDURE — 77067 SCR MAMMO BI INCL CAD: CPT

## 2025-02-18 NOTE — TELEPHONE ENCOUNTER
"Relay     \"Her mammogram is benign.  She needs to do a routine mammogram in 1 year \"                "

## 2025-02-24 ENCOUNTER — OFFICE VISIT (OUTPATIENT)
Dept: FAMILY MEDICINE CLINIC | Facility: CLINIC | Age: 60
End: 2025-02-24
Payer: COMMERCIAL

## 2025-02-24 VITALS
SYSTOLIC BLOOD PRESSURE: 125 MMHG | TEMPERATURE: 97.5 F | OXYGEN SATURATION: 90 % | HEIGHT: 65 IN | HEART RATE: 65 BPM | WEIGHT: 162 LBS | DIASTOLIC BLOOD PRESSURE: 79 MMHG | BODY MASS INDEX: 26.99 KG/M2

## 2025-02-24 DIAGNOSIS — E78.00 PURE HYPERCHOLESTEROLEMIA: Primary | ICD-10-CM

## 2025-02-24 DIAGNOSIS — I10 PRIMARY HYPERTENSION: ICD-10-CM

## 2025-02-24 DIAGNOSIS — F41.9 ANXIETY: ICD-10-CM

## 2025-02-24 LAB
ALBUMIN SERPL-MCNC: 4 G/DL (ref 3.5–5.2)
ALBUMIN/GLOB SERPL: 1.1 G/DL
ALP SERPL-CCNC: 130 U/L (ref 39–117)
ALT SERPL W P-5'-P-CCNC: 22 U/L (ref 1–33)
ANION GAP SERPL CALCULATED.3IONS-SCNC: 8.2 MMOL/L (ref 5–15)
AST SERPL-CCNC: 30 U/L (ref 1–32)
BILIRUB SERPL-MCNC: 0.3 MG/DL (ref 0–1.2)
BUN SERPL-MCNC: 13 MG/DL (ref 6–20)
BUN/CREAT SERPL: 14.6 (ref 7–25)
CALCIUM SPEC-SCNC: 9 MG/DL (ref 8.6–10.5)
CHLORIDE SERPL-SCNC: 106 MMOL/L (ref 98–107)
CHOLEST SERPL-MCNC: 118 MG/DL (ref 0–200)
CO2 SERPL-SCNC: 25.8 MMOL/L (ref 22–29)
CREAT SERPL-MCNC: 0.89 MG/DL (ref 0.57–1)
EGFRCR SERPLBLD CKD-EPI 2021: 74.8 ML/MIN/1.73
GLOBULIN UR ELPH-MCNC: 3.6 GM/DL
GLUCOSE SERPL-MCNC: 91 MG/DL (ref 65–99)
HDLC SERPL-MCNC: 21 MG/DL (ref 40–60)
LDLC SERPL CALC-MCNC: 76 MG/DL (ref 0–100)
LDLC/HDLC SERPL: 3.54 {RATIO}
POTASSIUM SERPL-SCNC: 4 MMOL/L (ref 3.5–5.2)
PROT SERPL-MCNC: 7.6 G/DL (ref 6–8.5)
SODIUM SERPL-SCNC: 140 MMOL/L (ref 136–145)
TRIGL SERPL-MCNC: 113 MG/DL (ref 0–150)
VLDLC SERPL-MCNC: 21 MG/DL (ref 5–40)

## 2025-02-24 PROCEDURE — 80061 LIPID PANEL: CPT | Performed by: FAMILY MEDICINE

## 2025-02-24 PROCEDURE — 80053 COMPREHEN METABOLIC PANEL: CPT | Performed by: FAMILY MEDICINE

## 2025-02-24 PROCEDURE — 99214 OFFICE O/P EST MOD 30 MIN: CPT | Performed by: FAMILY MEDICINE

## 2025-02-24 RX ORDER — CITALOPRAM HYDROBROMIDE 20 MG/1
20 TABLET ORAL DAILY
Qty: 30 TABLET | Refills: 0 | Status: SHIPPED | OUTPATIENT
Start: 2025-02-24

## 2025-02-24 NOTE — ASSESSMENT & PLAN NOTE
Her blood pressure is great here as well as at home.  Will continue her current meds and update her labs

## 2025-02-24 NOTE — ASSESSMENT & PLAN NOTE
Will go ahead and restart her citalopram.  In the meantime we will get a GeneSight.  She has tried multiple meds without adequate success.

## 2025-02-24 NOTE — PROGRESS NOTES
Chief Complaint   Patient presents with    Follow-up     6 month     Hypertension        Subjective     Romelia Young  has a past medical history of Anemia, unspecified, Ankle sprain (06/21/2018), Anxiety, Arthritis, Bursitis of hip, right (04/15/2018), Bursitis of right shoulder (09/25/2018), Callus, Chronic allergic rhinitis, Concussion (08/2012), GERD (gastroesophageal reflux disease), Hemorrhoids, Hernia, hiatal, History of transfusion, Infectious viral hepatitis (C antibodies), Ingrown toenail (Past), Left ankle pain (06/26/2018), Menopause, Pain of right hip joint (04/25/2018), Plantar fasciitis (Past), Right shoulder pain (04/25/2018), Seasonal allergies, Sensitive skin, Shoulder impingement, right (09/25/2018), Shoulder tendinitis (07/09/2015), Shoulder tendinitis, right (04/25/2018), and Trochanteric bursitis, right hip (06/26/2018).    Anxiety disorder-she states that recently the Pristiq made her feel more anxious.  In the past she has been on citalopram, paroxetine, duloxetine, sertraline, Effexor, BuSpar, Wellbutrin    Hypertension  This is a recurrent problem. The current episode started more than 1 year ago. The problem has been improved since onset. The problem is controlled. Associated symptoms include anxiety. Pertinent negatives include no chest pain, headaches or shortness of breath. Agents associated with hypertension include NSAIDs. Risk factors for coronary artery disease include post-menopausal state and dyslipidemia. Current antihypertension treatment includes angiotensin blockers. The current treatment provides significant improvement. There are no compliance problems.  There is no history of CAD/MI.   Additional comments: Follow up appt concerning bp  Hyperlipidemia  This is a chronic problem. The current episode started more than 1 year ago. The problem is controlled. Recent lipid tests were reviewed and are normal. Pertinent negatives include no chest pain or shortness of breath.  Current antihyperlipidemic treatment includes statins. The current treatment provides significant improvement of lipids. There are no compliance problems.  Risk factors for coronary artery disease include hypertension, dyslipidemia and post-menopausal.       PHQ-2 Depression Screening  Little interest or pleasure in doing things?     Feeling down, depressed, or hopeless?     PHQ-2 Total Score     PHQ-9 Depression Screening  Little interest or pleasure in doing things?     Feeling down, depressed, or hopeless?     Trouble falling or staying asleep, or sleeping too much?     Feeling tired or having little energy?     Poor appetite or overeating?     Feeling bad about yourself - or that you are a failure or have let yourself or your family down?     Trouble concentrating on things, such as reading the newspaper or watching television?     Moving or speaking so slowly that other people could have noticed? Or the opposite - being so fidgety or restless that you have been moving around a lot more than usual?     Thoughts that you would be better off dead, or of hurting yourself in some way?     PHQ-9 Total Score     If you checked off any problems, how difficult have these problems made it for you to do your work, take care of things at home, or get along with other people?       No Known Allergies    Prior to Admission medications    Medication Sig Start Date End Date Taking? Authorizing Provider   acyclovir (ZOVIRAX) 5 % ointment Use as needed for cold sores up to 5 times a day 8/22/24  Yes Avery Espinosa, DO   atorvastatin (Lipitor) 10 MG tablet Take 1 tablet by mouth Every Night. 8/22/24  Yes Avery Espinosa, DO   celecoxib (CeleBREX) 100 MG capsule Take 1 capsule by mouth Daily. 8/22/24  Yes Avery Espinosa, DO   cetirizine (zyrTEC) 10 MG tablet Take 1 tablet by mouth Daily. 8/22/24  Yes Avery Espinosa, DO   famotidine (PEPCID) 20 MG tablet Take 1 tablet by mouth 2 (Two) Times a Day  As Needed for Heartburn. 8/22/24  Yes Avery Espinosa DO   fluticasone (FLONASE) 50 MCG/ACT nasal spray Administer 2 sprays into the nostril(s) as directed by provider Daily. 8/22/24  Yes Avery Espinosa DO   losartan (Cozaar) 100 MG tablet Take 1 tablet by mouth Daily. 8/22/24  Yes Avery Espinosa DO   metaxalone (Skelaxin) 800 MG tablet Take 1 tablet by mouth 3 (Three) Times a Day As Needed for Muscle Spasms. 8/28/23  Yes Avery Espinosa DO   promethazine (PHENERGAN) 12.5 MG tablet Take 1 tablet by mouth every 6 hours as needed 8/22/24  Yes Avery Espinosa DO   desvenlafaxine (Pristiq) 100 MG 24 hr tablet Take 1 tablet by mouth Daily. 1/31/25 2/24/25 Yes Avery Espinosa DO   Docusate Calcium (STOOL SOFTENER PO) Take  by mouth As Needed.  2/24/25 Yes Provider, MD Erwin   amoxicillin-clavulanate (AUGMENTIN) 875-125 MG per tablet Take 1 tablet by mouth Every 12 (Twelve) Hours. 12/16/24 2/24/25     nitrofurantoin, macrocrystal-monohydrate, (Macrobid) 100 MG capsule Take 1 capsule by mouth 2 (Two) Times a Day. 9/11/24 2/24/25  Chela Wade APRN   phenazopyridine (Pyridium) 200 MG tablet Take 1 tablet by mouth 3 (Three) Times a Day As Needed for Bladder Spasms or Dysuria. 9/11/24 2/24/25  Chela Wade APRN   predniSONE (DELTASONE) 5 MG tablet Take 6 tablets by mouth on day 1, then decrease by 1 tablet daily until gone 12/16/24 2/24/25     Zoster Vac Recomb Adjuvanted (Shingrix) 50 MCG/0.5ML reconstituted suspension Inject 0.5 mL into the appropriate muscle as directed by prescriber. 2/18/25 2/24/25          Patient Active Problem List   Diagnosis    Trochanteric bursitis    Bilateral hip pain    GERD (gastroesophageal reflux disease)    Anemia    Bursitis    Disorder of bursae and tendons in shoulder region    Dupuytren contracture    Left ankle pain    Right shoulder pain    Chronic allergic rhinitis    Shoulder impingement, right     Sprain of ankle    Trochanteric bursitis of right hip    Pain of right hip joint    Bursitis of shoulder    Anxiety    Low HDL (under 40)    Encounter for annual routine gynecological examination    Screening breast examination    Hypertension    Palpitations    Sinobronchitis    Annual physical exam    Encounter for hepatitis C screening test for low risk patient    Pure hypercholesterolemia        Past Surgical History:   Procedure Laterality Date     SECTION  2002    CLAVICLE SURGERY      clavicle fracture surgery - ORIF    COLONOSCOPY  2017    ENDOSCOPY      OTHER SURGICAL HISTORY      Metal Implant    OTHER SURGICAL HISTORY      Tuboplasty    TOENAIL EXCISION         Social History     Socioeconomic History    Marital status:    Tobacco Use    Smoking status: Former     Current packs/day: 0.00     Average packs/day: 0.3 packs/day for 12.0 years (3.0 ttl pk-yrs)     Types: Cigarettes     Start date: 1983     Quit date: 1995     Years since quittin.1    Smokeless tobacco: Never   Vaping Use    Vaping status: Never Used   Substance and Sexual Activity    Alcohol use: Never    Drug use: Never    Sexual activity: Defer       Family History   Problem Relation Age of Onset    Heart disease Mother     Hypertension Mother     Arthritis Mother     Osteoporosis Mother     Heart disease Father     Hypertension Father     Diabetes Father     Arthritis Father     Heart disease Sister     Diabetes Sister     Urolithiasis Brother        Family history, surgical history, past medical history, Allergies and meds reviewed with patient today and updated in [employer] EMR.     ROS:  Review of Systems   Constitutional:  Negative for fatigue.   Respiratory:  Negative for cough, chest tightness, shortness of breath and wheezing.    Cardiovascular:  Negative for chest pain.   Neurological:  Negative for headache.   Psychiatric/Behavioral:  Negative for depressed mood. The patient is nervous/anxious.   "      OBJECTIVE:  Vitals:    02/24/25 1342   BP: 125/79   BP Location: Left arm   Patient Position: Sitting   Pulse: 65   Temp: 97.5 °F (36.4 °C)   SpO2: 90%   Weight: 73.5 kg (162 lb)   Height: 165.1 cm (65\")     No results found.   Body mass index is 26.96 kg/m².  No LMP recorded (lmp unknown). Patient is postmenopausal.    The 10-year ASCVD risk score (Tricia DK, et al., 2019) is: 5.5%    Values used to calculate the score:      Age: 59 years      Sex: Female      Is Non- : No      Diabetic: No      Tobacco smoker: No      Systolic Blood Pressure: 125 mmHg      Is BP treated: Yes      HDL Cholesterol: 25 mg/dL      Total Cholesterol: 155 mg/dL     Physical Exam  Vitals and nursing note reviewed.   Constitutional:       General: She is not in acute distress.     Appearance: Normal appearance. She is normal weight.   HENT:      Head: Normocephalic.      Right Ear: Tympanic membrane, ear canal and external ear normal.      Left Ear: Tympanic membrane, ear canal and external ear normal.      Nose: Nose normal.      Mouth/Throat:      Mouth: Mucous membranes are moist.      Pharynx: Oropharynx is clear.   Eyes:      General: No scleral icterus.     Conjunctiva/sclera: Conjunctivae normal.      Pupils: Pupils are equal, round, and reactive to light.   Cardiovascular:      Rate and Rhythm: Normal rate and regular rhythm.      Pulses: Normal pulses.      Heart sounds: Normal heart sounds. No murmur heard.  Pulmonary:      Effort: Pulmonary effort is normal.      Breath sounds: Normal breath sounds. No wheezing, rhonchi or rales.   Musculoskeletal:      Cervical back: Neck supple. No rigidity or tenderness.   Lymphadenopathy:      Cervical: No cervical adenopathy.   Skin:     General: Skin is warm and dry.      Coloration: Skin is not jaundiced.      Findings: No rash.   Neurological:      General: No focal deficit present.      Mental Status: She is alert and oriented to person, place, and time. "   Psychiatric:         Mood and Affect: Mood normal.         Thought Content: Thought content normal.         Judgment: Judgment normal.         Procedures    No visits with results within 30 Day(s) from this visit.   Latest known visit with results is:   Lab Requisition on 11/29/2024   Component Date Value Ref Range Status    Hep B S Ab 11/29/2024 Reactive   Final       ASSESSMENT/ PLAN:    Diagnoses and all orders for this visit:    1. Pure hypercholesterolemia (Primary)  Assessment & Plan:   Will update her lipid profile with her labs here today.    Orders:  -     Comprehensive Metabolic Panel  -     Lipid Panel    2. Primary hypertension  Assessment & Plan:  Her blood pressure is great here as well as at home.  Will continue her current meds and update her labs    Orders:  -     Comprehensive Metabolic Panel  -     Lipid Panel    3. Anxiety  Assessment & Plan:  Will go ahead and restart her citalopram.  In the meantime we will get a GeneSight.  She has tried multiple meds without adequate success.    Orders:  -     GeneSight - Swab,; Future    Other orders  -     citalopram (CeleXA) 20 MG tablet; Take 1 tablet by mouth Daily.  Dispense: 30 tablet; Refill: 0        BMI is >= 25 and <30. (Overweight) The following options were offered after discussion;: exercise counseling/recommendations and nutrition counseling/recommendations      Orders Placed Today:     New Medications Ordered This Visit   Medications    citalopram (CeleXA) 20 MG tablet     Sig: Take 1 tablet by mouth Daily.     Dispense:  30 tablet     Refill:  0        Management Plan:     An After Visit Summary was printed and given to the patient at discharge.    Follow-up: Return in about 6 months (around 8/24/2025) for Recheck.    Avery Espinosa DO 2/24/2025 14:05 EST  This note was electronically signed.

## 2025-02-28 ENCOUNTER — TELEPHONE (OUTPATIENT)
Dept: FAMILY MEDICINE CLINIC | Facility: CLINIC | Age: 60
End: 2025-02-28
Payer: COMMERCIAL

## 2025-02-28 NOTE — TELEPHONE ENCOUNTER
Dr. Espinosa stated:     I reviewed her [facility] results.  In reviewing what previous meds she states she has been taken and comparing that to her list.  She has a couple options.  It would be mirtazapine (Remeron) Viibryd or Trintellix.  My recommendation would be Trintellix.  Would she be willing to try this alternative?   For Celexa citalopram is in the yellow range.      After speaking to pt. She is going to reach out to insurance and see what is affordable. Patient stated previously the trintellix was too expensive and she would have to go off of the celexa taking that.

## 2025-03-10 NOTE — TELEPHONE ENCOUNTER
Attempted to reach pt, unable to leave vm. HUB TO RELAY:   If she has commercial insurance there is usually a co-pay card that can offset that expense. If she would like to consider that we can look for a co-pay card first.

## 2025-03-12 RX ORDER — CITALOPRAM HYDROBROMIDE 20 MG/1
20 TABLET ORAL DAILY
Qty: 90 TABLET | Refills: 1 | Status: SHIPPED | OUTPATIENT
Start: 2025-03-12

## 2025-03-12 NOTE — TELEPHONE ENCOUNTER
"Caller: Romelia Young \"DEDE\"    Relationship: Self    Best call back number: [phone]     Requested Prescriptions:   Requested Prescriptions     Pending Prescriptions Disp Refills    citalopram (CeleXA) 20 MG tablet 30 tablet 0     Sig: Take 1 tablet by mouth Daily.        Pharmacy where request should be sent: [facility]     Last office visit with prescribing clinician: 2/24/2025   Last telemedicine visit with prescribing clinician: Visit date not found   Next office visit with prescribing clinician: 8/26/2025     Additional details provided by patient: PATIENT WOULD LIKE TO STAY ON HER CELEXA UNTIL HER NEXT APPOINTMENT    Does the patient have less than a 3 day supply:  [] Yes  [x] No    Would you like a call back once the refill request has been completed: [] Yes [] No    If the office needs to give you a call back, can they leave a voicemail: [] Yes [] No    Ann Lamar Rep   03/12/25 13:55 EDT   "

## 2025-04-03 ENCOUNTER — OFFICE VISIT (OUTPATIENT)
Dept: ORTHOPEDIC SURGERY | Facility: CLINIC | Age: 60
End: 2025-04-03
Payer: COMMERCIAL

## 2025-04-03 VITALS
WEIGHT: 160 LBS | OXYGEN SATURATION: 97 % | DIASTOLIC BLOOD PRESSURE: 82 MMHG | HEART RATE: 74 BPM | SYSTOLIC BLOOD PRESSURE: 125 MMHG | HEIGHT: 65 IN | BODY MASS INDEX: 26.66 KG/M2

## 2025-04-03 DIAGNOSIS — M25.511 RIGHT SHOULDER PAIN, UNSPECIFIED CHRONICITY: Primary | ICD-10-CM

## 2025-04-03 DIAGNOSIS — M77.8 LEFT SHOULDER TENDONITIS: ICD-10-CM

## 2025-04-03 DIAGNOSIS — M75.101 TEAR OF RIGHT ROTATOR CUFF, UNSPECIFIED TEAR EXTENT, UNSPECIFIED WHETHER TRAUMATIC: ICD-10-CM

## 2025-04-03 DIAGNOSIS — M25.512 LEFT SHOULDER PAIN, UNSPECIFIED CHRONICITY: ICD-10-CM

## 2025-04-03 RX ORDER — TRIAMCINOLONE ACETONIDE 40 MG/ML
40 INJECTION, SUSPENSION INTRA-ARTICULAR; INTRAMUSCULAR
Status: COMPLETED | OUTPATIENT
Start: 2025-04-03 | End: 2025-04-03

## 2025-04-03 RX ORDER — LIDOCAINE HYDROCHLORIDE 10 MG/ML
5 INJECTION, SOLUTION INFILTRATION; PERINEURAL
Status: COMPLETED | OUTPATIENT
Start: 2025-04-03 | End: 2025-04-03

## 2025-04-03 RX ADMIN — LIDOCAINE HYDROCHLORIDE 5 ML: 10 INJECTION, SOLUTION INFILTRATION; PERINEURAL at 14:30

## 2025-04-03 RX ADMIN — TRIAMCINOLONE ACETONIDE 40 MG: 40 INJECTION, SUSPENSION INTRA-ARTICULAR; INTRAMUSCULAR at 14:30

## 2025-04-03 NOTE — PROGRESS NOTES
"Chief Complaint  Follow-up and Pain of the Left Shoulder and Follow-up and Pain of the Right Shoulder       Subjective      Romelia Young presents to [facility] for a follow up for bilateral shoulder. She has been treating her left shoulder tendonitis and right shoulder rotator cuff tear conservatively. She was last seen in the office on 10/31/23 where she had bilateral shoulder steroid injections. She states these injections gave her great relief. She states her left shoulder is worse than her right shoulder. She has pain with range of motion, reaching and lifting. She denies any new injury or falls since her last visit. She had prior clavicle ORIF in the past on her left side.     No Known Allergies     Social History     Socioeconomic History    Marital status:    Tobacco Use    Smoking status: Former     Current packs/day: 0.00     Average packs/day: 0.3 packs/day for 12.0 years (3.0 ttl pk-yrs)     Types: Cigarettes     Start date: 1983     Quit date: 1995     Years since quittin.2    Smokeless tobacco: Never   Vaping Use    Vaping status: Never Used   Substance and Sexual Activity    Alcohol use: Never    Drug use: Never    Sexual activity: Defer        I reviewed the patient's chief complaint, history of present illness, review of systems, past medical history, surgical history, family history, social history, medications, and allergy list.     Review of Systems     Constitutional: Denies fevers, chills, weight loss  Cardiovascular: Denies chest pain, shortness of breath  Skin: Denies rashes, acute skin changes  Neurologic: Denies headache, loss of consciousness  MSK: bilateral shoulder pain       Vital Signs:   /82   Pulse 74   Ht 165.1 cm (65\")   Wt 72.6 kg (160 lb)   SpO2 97%   BMI 26.63 kg/m²            Ortho Exam    Physical Exam  General:Alert. No acute distress     Right upper extremity: forward elevation  to 170 degrees, abduction to " 160 degrees, external rotation  to 80 degrees, internal rotation to 60 degrees, 4 plus supraspinatus strength, 5/5 infraspinatus and subsacp, internal rotation to T12, mild pain with impingement, mild pain with cross arm abduction, neurovascularly intact, sensation intact to the medial, radial and ulnar nerve, positive  pulses.     Left upper extremity: forward elevation  to 170 degrees, abduction to 150 degrees, external rotation  to 75 degrees, internal rotation to 50 degrees, 4 plus supraspinatus strength, 5/5 infraspinatus and subscap, internal rotation to T12, negative  impingement, mild pain with cross arm, neurovascularly intact, sensation intact to the medial, radial and ulnar nerve, positive  pulses.     RIGHT SHOULDER INJECTION  Date/Time: 4/3/2025 2:30 PM  Consent given by: patient  Site marked: site marked  Timeout: Immediately prior to procedure a time out was called to verify the correct patient, procedure, equipment, support staff and site/side marked as required   Supporting Documentation  Indications: pain   Procedure Details  Location: shoulder -   Needle gauge: 21G.  Medications administered: 5 mL lidocaine 1 %; 40 mg triamcinolone acetonide 40 MG/ML  Patient tolerance: patient tolerated the procedure well with no immediate complications      LEFT SHOULDER INJECTION  Date/Time: 4/3/2025 2:30 PM  Consent given by: patient  Site marked: site marked  Timeout: Immediately prior to procedure a time out was called to verify the correct patient, procedure, equipment, support staff and site/side marked as required   Supporting Documentation  Indications: pain   Procedure Details  Location: shoulder -   Needle gauge: 21G.  Medications administered: 5 mL lidocaine 1 %; 40 mg triamcinolone acetonide 40 MG/ML  Patient tolerance: patient tolerated the procedure well with no immediate complications    This injection documentation was Scribed for Ephraim Lamb MD by Allyssa Troy MA.  04/03/25   14:31  EDT    X-Ray Report:  Right scapula X-Ray  Indication: Evaluation of right shoulder pain   AP/Lateral view(s)  Findings: degenerative changes to the shoulder with enthesophyte to the greater tuberosity, moderate degenerative changes to the glenohumeral, no acute fracture or dislocation   Prior studies available for comparison: yes     X-Ray Report:  Left scapula X-Ray  Indication: Evaluation of left shoulder pain   AP/Lateral view(s)  Findings: intact left clavicle hardware, mild degenerative changes to the acromioclavicular joint and glenohumeral, no acute fracture   Prior studies available for comparison: yes       Imaging Results (Most Recent)       Procedure Component Value Units Date/Time    XR Shoulder 2+ View Left [[id number]] Resulted: 04/03/25 1351     Updated: 04/03/25 1357    XR Shoulder 2+ View Right [[id number]] Resulted: 04/03/25 1351     Updated: 04/03/25 1357             Result Review :       No results found.           Assessment and Plan     Diagnoses and all orders for this visit:    1. Right shoulder pain, unspecified chronicity (Primary)  -     XR Shoulder 2+ View Right    2. Left shoulder pain, unspecified chronicity  -     XR Shoulder 2+ View Left    3. Left shoulder tendonitis    4. Tear of right rotator cuff, unspecified tear extent, unspecified whether traumatic      The patient presents here today for a follow up for bilateral shoulder. X-rays were obtained in the office today and these were reviewed today.     Discussed the risks and benefits of bilateral shoulder steroid injections today in the office. Patient expressed understanding and wishes to proceed. Patient tolerted the injection well and without any complications.     Home exercises given today and will continue current medications for pain control.     May consider MRI's on her shoulder in the future if symptoms persist.      Call or return if worsening symptoms.    Follow Up     PRN     Patient was given instructions and  counseling regarding her condition or for health maintenance advice. Please see specific information pulled into the AVS if appropriate.     Scribed for Ephraim Lamb MD by Claudia Flores.  04/03/25   13:56 EDT    I have personally performed the services described in this document as scribed by the above individual and it is both accurate and complete. Ephraim Lamb MD 04/03/25

## 2025-04-07 DIAGNOSIS — F41.9 ANXIETY: ICD-10-CM

## 2025-04-07 RX ORDER — CITALOPRAM HYDROBROMIDE 40 MG/1
40 TABLET ORAL DAILY
Qty: 90 TABLET | Refills: 3 | OUTPATIENT
Start: 2025-04-07

## 2025-05-02 RX ORDER — DESVENLAFAXINE 100 MG/1
100 TABLET, EXTENDED RELEASE ORAL DAILY
Qty: 90 TABLET | Refills: 0 | OUTPATIENT
Start: 2025-05-02

## 2025-05-13 RX ORDER — LOSARTAN POTASSIUM 100 MG/1
100 TABLET ORAL DAILY
Qty: 90 TABLET | Refills: 1 | Status: SHIPPED | OUTPATIENT
Start: 2025-05-13

## 2025-07-30 RX ORDER — CETIRIZINE HYDROCHLORIDE 10 MG/1
10 TABLET ORAL DAILY
Qty: 90 TABLET | Refills: 3 | Status: SHIPPED | OUTPATIENT
Start: 2025-07-30

## 2025-08-26 ENCOUNTER — LAB (OUTPATIENT)
Facility: HOSPITAL | Age: 60
End: 2025-08-26
Payer: COMMERCIAL

## 2025-08-26 ENCOUNTER — OFFICE VISIT (OUTPATIENT)
Dept: FAMILY MEDICINE CLINIC | Facility: CLINIC | Age: 60
End: 2025-08-26
Payer: COMMERCIAL

## 2025-08-26 VITALS
HEIGHT: 65 IN | HEART RATE: 80 BPM | SYSTOLIC BLOOD PRESSURE: 121 MMHG | TEMPERATURE: 97.3 F | BODY MASS INDEX: 25.49 KG/M2 | DIASTOLIC BLOOD PRESSURE: 73 MMHG | WEIGHT: 153 LBS | OXYGEN SATURATION: 96 %

## 2025-08-26 DIAGNOSIS — Z01.419 ENCOUNTER FOR ANNUAL ROUTINE GYNECOLOGICAL EXAMINATION: ICD-10-CM

## 2025-08-26 DIAGNOSIS — K21.9 GASTROESOPHAGEAL REFLUX DISEASE WITHOUT ESOPHAGITIS: ICD-10-CM

## 2025-08-26 DIAGNOSIS — F41.9 ANXIETY: ICD-10-CM

## 2025-08-26 DIAGNOSIS — E78.6 LOW HDL (UNDER 40): ICD-10-CM

## 2025-08-26 DIAGNOSIS — I10 PRIMARY HYPERTENSION: ICD-10-CM

## 2025-08-26 DIAGNOSIS — E78.00 PURE HYPERCHOLESTEROLEMIA: Primary | ICD-10-CM

## 2025-08-26 DIAGNOSIS — Z78.0 MENOPAUSE: ICD-10-CM

## 2025-08-26 DIAGNOSIS — M25.50 ARTHRALGIA, UNSPECIFIED JOINT: ICD-10-CM

## 2025-08-26 LAB
ALBUMIN SERPL-MCNC: 4 G/DL (ref 3.5–5.2)
ALBUMIN/GLOB SERPL: 1.2 G/DL
ALP SERPL-CCNC: 101 U/L (ref 39–117)
ALT SERPL W P-5'-P-CCNC: 17 U/L (ref 1–33)
ANION GAP SERPL CALCULATED.3IONS-SCNC: 11.8 MMOL/L (ref 5–15)
AST SERPL-CCNC: 25 U/L (ref 1–32)
BILIRUB SERPL-MCNC: 0.4 MG/DL (ref 0–1.2)
BUN SERPL-MCNC: 12 MG/DL (ref 6–20)
BUN/CREAT SERPL: 13.3 (ref 7–25)
CALCIUM SPEC-SCNC: 9.1 MG/DL (ref 8.6–10.5)
CHLORIDE SERPL-SCNC: 104 MMOL/L (ref 98–107)
CHOLEST SERPL-MCNC: 121 MG/DL (ref 0–200)
CO2 SERPL-SCNC: 22.2 MMOL/L (ref 22–29)
CREAT SERPL-MCNC: 0.9 MG/DL (ref 0.57–1)
EGFRCR SERPLBLD CKD-EPI 2021: 73.8 ML/MIN/1.73
GLOBULIN UR ELPH-MCNC: 3.3 GM/DL
GLUCOSE SERPL-MCNC: 76 MG/DL (ref 65–99)
HDLC SERPL-MCNC: 25 MG/DL (ref 40–60)
LDLC SERPL CALC-MCNC: 80 MG/DL (ref 0–100)
LDLC/HDLC SERPL: 3.22 {RATIO}
POTASSIUM SERPL-SCNC: 4.1 MMOL/L (ref 3.5–5.2)
PROT SERPL-MCNC: 7.3 G/DL (ref 6–8.5)
SODIUM SERPL-SCNC: 138 MMOL/L (ref 136–145)
TRIGL SERPL-MCNC: 78 MG/DL (ref 0–150)
TSH SERPL DL<=0.05 MIU/L-ACNC: 1.92 UIU/ML (ref 0.27–4.2)
VLDLC SERPL-MCNC: 16 MG/DL (ref 5–40)

## 2025-08-26 PROCEDURE — 80061 LIPID PANEL: CPT | Performed by: FAMILY MEDICINE

## 2025-08-26 PROCEDURE — 84443 ASSAY THYROID STIM HORMONE: CPT | Performed by: FAMILY MEDICINE

## 2025-08-26 PROCEDURE — 80053 COMPREHEN METABOLIC PANEL: CPT | Performed by: FAMILY MEDICINE

## 2025-08-26 PROCEDURE — G0123 SCREEN CERV/VAG THIN LAYER: HCPCS | Performed by: FAMILY MEDICINE

## 2025-08-26 PROCEDURE — 87624 HPV HI-RISK TYP POOLED RSLT: CPT | Performed by: FAMILY MEDICINE

## 2025-08-26 RX ORDER — CETIRIZINE HYDROCHLORIDE 10 MG/1
10 TABLET ORAL DAILY
Qty: 90 TABLET | Refills: 3 | Status: SHIPPED | OUTPATIENT
Start: 2025-08-26

## 2025-08-26 RX ORDER — PROMETHAZINE HYDROCHLORIDE 12.5 MG/1
12.5 TABLET ORAL EVERY 6 HOURS PRN
Qty: 20 TABLET | Refills: 0 | Status: SHIPPED | OUTPATIENT
Start: 2025-08-26

## 2025-08-26 RX ORDER — FAMOTIDINE 20 MG/1
20 TABLET, FILM COATED ORAL 2 TIMES DAILY PRN
Qty: 180 TABLET | Refills: 3 | Status: SHIPPED | OUTPATIENT
Start: 2025-08-26

## 2025-08-26 RX ORDER — CELECOXIB 100 MG/1
100 CAPSULE ORAL DAILY
Qty: 90 CAPSULE | Refills: 3 | Status: SHIPPED | OUTPATIENT
Start: 2025-08-26

## 2025-08-26 RX ORDER — ACYCLOVIR 50 MG/G
OINTMENT TOPICAL
Qty: 15 G | Refills: 1 | Status: SHIPPED | OUTPATIENT
Start: 2025-08-26

## 2025-08-26 RX ORDER — LOSARTAN POTASSIUM 100 MG/1
100 TABLET ORAL DAILY
Qty: 90 TABLET | Refills: 3 | Status: SHIPPED | OUTPATIENT
Start: 2025-08-26

## 2025-08-26 RX ORDER — CITALOPRAM HYDROBROMIDE 20 MG/1
20 TABLET ORAL DAILY
Qty: 90 TABLET | Refills: 3 | Status: SHIPPED | OUTPATIENT
Start: 2025-08-26

## 2025-08-26 RX ORDER — CONJUGATED ESTROGENS 0.62 MG/G
CREAM VAGINAL
Qty: 30 G | Refills: 5 | Status: SHIPPED | OUTPATIENT
Start: 2025-08-26

## 2025-08-26 RX ORDER — ATORVASTATIN CALCIUM 10 MG/1
10 TABLET, FILM COATED ORAL NIGHTLY
Qty: 90 TABLET | Refills: 3 | Status: SHIPPED | OUTPATIENT
Start: 2025-08-26

## 2025-08-28 LAB
CYTOLOGIST CVX/VAG CYTO: NORMAL
CYTOLOGY CVX/VAG DOC CYTO: NORMAL
CYTOLOGY CVX/VAG DOC THIN PREP: NORMAL
DX ICD CODE: NORMAL
HPV I/H RISK 4 DNA CVX QL PROBE+SIG AMP: NEGATIVE
OTHER STN SPEC: NORMAL
SERVICE CMNT-IMP: NORMAL
STAT OF ADQ CVX/VAG CYTO-IMP: NORMAL